# Patient Record
Sex: FEMALE | Race: WHITE | NOT HISPANIC OR LATINO | Employment: FULL TIME | URBAN - METROPOLITAN AREA
[De-identification: names, ages, dates, MRNs, and addresses within clinical notes are randomized per-mention and may not be internally consistent; named-entity substitution may affect disease eponyms.]

---

## 2017-02-07 LAB
EXTERNAL ABO GROUPING: NORMAL
EXTERNAL ANTIBODY SCREEN: NEGATIVE
EXTERNAL CHLAMYDIA SCREEN: NEGATIVE
EXTERNAL GONORRHEA SCREEN: NEGATIVE
EXTERNAL RH FACTOR: POSITIVE
EXTERNAL RUBELLA QUALITATIVE: NORMAL
EXTERNAL SYPHILIS RPR SCREEN: NEGATIVE
EXTERNAL URINE DRUG SCREEN: NEGATIVE
HIV1 AB SPEC QL IA.RAPID: NEGATIVE

## 2017-04-29 PROCEDURE — 87086 URINE CULTURE/COLONY COUNT: CPT | Performed by: FAMILY MEDICINE

## 2017-05-01 ENCOUNTER — TELEPHONE (OUTPATIENT)
Dept: URGENT CARE | Facility: CLINIC | Age: 32
End: 2017-05-01

## 2017-05-07 ENCOUNTER — HOSPITAL ENCOUNTER (OUTPATIENT)
Facility: HOSPITAL | Age: 32
Setting detail: OBSERVATION
Discharge: HOME | End: 2017-05-08
Attending: OBSTETRICS & GYNECOLOGY | Admitting: OBSTETRICS & GYNECOLOGY

## 2017-05-07 PROBLEM — O26.892 PELVIC PAIN AFFECTING PREGNANCY IN SECOND TRIMESTER, ANTEPARTUM: Status: ACTIVE | Noted: 2017-05-07

## 2017-05-07 PROBLEM — B96.89 BACTERIAL VAGINOSIS: Status: ACTIVE | Noted: 2017-05-07

## 2017-05-07 PROBLEM — R10.2 PELVIC PAIN AFFECTING PREGNANCY IN SECOND TRIMESTER, ANTEPARTUM: Status: ACTIVE | Noted: 2017-05-07

## 2017-05-07 PROBLEM — N76.0 BACTERIAL VAGINOSIS: Status: ACTIVE | Noted: 2017-05-07

## 2017-05-07 PROBLEM — O20.9 VAGINAL BLEEDING BEFORE 22 WEEKS GESTATION: Status: ACTIVE | Noted: 2017-05-07

## 2017-05-07 LAB
ABO GROUP BLD: NORMAL
BILIRUB UR QL STRIP: NEGATIVE
BLD GP AB SCN SERPL QL: NEGATIVE
CLARITY UR: CLEAR
COLOR UR: YELLOW
DEPRECATED RDW RBC AUTO: 47.2 FL (ref 37–54)
ERYTHROCYTE [DISTWIDTH] IN BLOOD BY AUTOMATED COUNT: 13.6 % (ref 11.3–14.5)
GLUCOSE UR STRIP-MCNC: NEGATIVE MG/DL
HCT VFR BLD AUTO: 31.1 % (ref 34.5–44)
HGB BLD-MCNC: 10.1 G/DL (ref 11.5–15.5)
HGB UR QL STRIP.AUTO: NEGATIVE
KETONES UR QL STRIP: ABNORMAL
LEUKOCYTE ESTERASE UR QL STRIP.AUTO: NEGATIVE
MCH RBC QN AUTO: 30.9 PG (ref 27–31)
MCHC RBC AUTO-ENTMCNC: 32.5 G/DL (ref 32–36)
MCV RBC AUTO: 95.1 FL (ref 80–99)
NITRITE UR QL STRIP: NEGATIVE
PH UR STRIP.AUTO: 6 [PH] (ref 5–8)
PLATELET # BLD AUTO: 246 10*3/MM3 (ref 150–450)
PMV BLD AUTO: 9.6 FL (ref 6–12)
PROT UR QL STRIP: NEGATIVE
RBC # BLD AUTO: 3.27 10*6/MM3 (ref 3.89–5.14)
RH BLD: POSITIVE
SP GR UR STRIP: 1.02 (ref 1–1.03)
UROBILINOGEN UR QL STRIP: ABNORMAL
WBC NRBC COR # BLD: 13.16 10*3/MM3 (ref 3.5–10.8)

## 2017-05-07 PROCEDURE — 86901 BLOOD TYPING SEROLOGIC RH(D): CPT | Performed by: OBSTETRICS & GYNECOLOGY

## 2017-05-07 PROCEDURE — 85027 COMPLETE CBC AUTOMATED: CPT | Performed by: OBSTETRICS & GYNECOLOGY

## 2017-05-07 PROCEDURE — 99218 PR INITIAL OBSERVATION CARE/DAY 30 MINUTES: CPT | Performed by: OBSTETRICS & GYNECOLOGY

## 2017-05-07 PROCEDURE — 81003 URINALYSIS AUTO W/O SCOPE: CPT | Performed by: OBSTETRICS & GYNECOLOGY

## 2017-05-07 PROCEDURE — G0378 HOSPITAL OBSERVATION PER HR: HCPCS

## 2017-05-07 PROCEDURE — 63710000001 PROMETHAZINE PER 25 MG: Performed by: OBSTETRICS & GYNECOLOGY

## 2017-05-07 PROCEDURE — 96374 THER/PROPH/DIAG INJ IV PUSH: CPT

## 2017-05-07 PROCEDURE — 96361 HYDRATE IV INFUSION ADD-ON: CPT

## 2017-05-07 PROCEDURE — 86900 BLOOD TYPING SEROLOGIC ABO: CPT | Performed by: OBSTETRICS & GYNECOLOGY

## 2017-05-07 PROCEDURE — 25010000002 MORPHINE PER 10 MG: Performed by: OBSTETRICS & GYNECOLOGY

## 2017-05-07 PROCEDURE — 86850 RBC ANTIBODY SCREEN: CPT | Performed by: OBSTETRICS & GYNECOLOGY

## 2017-05-07 RX ORDER — ACETAMINOPHEN 500 MG
1000 TABLET ORAL EVERY 4 HOURS PRN
Status: DISCONTINUED | OUTPATIENT
Start: 2017-05-07 | End: 2017-05-08 | Stop reason: HOSPADM

## 2017-05-07 RX ORDER — PROMETHAZINE HYDROCHLORIDE 25 MG/1
25 TABLET ORAL ONCE
Status: COMPLETED | OUTPATIENT
Start: 2017-05-07 | End: 2017-05-07

## 2017-05-07 RX ORDER — SODIUM CHLORIDE, SODIUM LACTATE, POTASSIUM CHLORIDE, CALCIUM CHLORIDE 600; 310; 30; 20 MG/100ML; MG/100ML; MG/100ML; MG/100ML
999 INJECTION, SOLUTION INTRAVENOUS ONCE
Status: COMPLETED | OUTPATIENT
Start: 2017-05-07 | End: 2017-05-07

## 2017-05-07 RX ORDER — MORPHINE SULFATE 4 MG/ML
4 INJECTION, SOLUTION INTRAMUSCULAR; INTRAVENOUS ONCE
Status: COMPLETED | OUTPATIENT
Start: 2017-05-07 | End: 2017-05-07

## 2017-05-07 RX ORDER — SODIUM CHLORIDE, SODIUM LACTATE, POTASSIUM CHLORIDE, CALCIUM CHLORIDE 600; 310; 30; 20 MG/100ML; MG/100ML; MG/100ML; MG/100ML
125 INJECTION, SOLUTION INTRAVENOUS CONTINUOUS
Status: DISCONTINUED | OUTPATIENT
Start: 2017-05-07 | End: 2017-05-08 | Stop reason: HOSPADM

## 2017-05-07 RX ADMIN — MORPHINE SULFATE 4 MG: 4 INJECTION, SOLUTION INTRAMUSCULAR; INTRAVENOUS at 22:12

## 2017-05-07 RX ADMIN — PROMETHAZINE HYDROCHLORIDE 25 MG: 25 TABLET ORAL at 22:11

## 2017-05-07 RX ADMIN — SODIUM CHLORIDE, POTASSIUM CHLORIDE, SODIUM LACTATE AND CALCIUM CHLORIDE 999 ML/HR: 600; 310; 30; 20 INJECTION, SOLUTION INTRAVENOUS at 22:08

## 2017-05-07 RX ADMIN — ACETAMINOPHEN 1000 MG: 500 TABLET ORAL at 20:04

## 2017-05-07 RX ADMIN — SODIUM CHLORIDE, POTASSIUM CHLORIDE, SODIUM LACTATE AND CALCIUM CHLORIDE 125 ML/HR: 600; 310; 30; 20 INJECTION, SOLUTION INTRAVENOUS at 22:59

## 2017-05-08 ENCOUNTER — APPOINTMENT (OUTPATIENT)
Dept: ULTRASOUND IMAGING | Facility: HOSPITAL | Age: 32
End: 2017-05-08

## 2017-05-08 ENCOUNTER — APPOINTMENT (OUTPATIENT)
Dept: WOMENS IMAGING | Facility: HOSPITAL | Age: 32
End: 2017-05-08

## 2017-05-08 VITALS
RESPIRATION RATE: 16 BRPM | SYSTOLIC BLOOD PRESSURE: 103 MMHG | WEIGHT: 146 LBS | TEMPERATURE: 98 F | DIASTOLIC BLOOD PRESSURE: 55 MMHG | BODY MASS INDEX: 22.91 KG/M2 | HEIGHT: 67 IN | HEART RATE: 85 BPM

## 2017-05-08 PROCEDURE — 76811 OB US DETAILED SNGL FETUS: CPT

## 2017-05-08 PROCEDURE — 76811 OB US DETAILED SNGL FETUS: CPT | Performed by: OBSTETRICS & GYNECOLOGY

## 2017-05-08 PROCEDURE — 96361 HYDRATE IV INFUSION ADD-ON: CPT

## 2017-05-08 PROCEDURE — 76775 US EXAM ABDO BACK WALL LIM: CPT

## 2017-05-08 PROCEDURE — 59025 FETAL NON-STRESS TEST: CPT

## 2017-05-08 PROCEDURE — 25010000002 MORPHINE PER 10 MG: Performed by: OBSTETRICS & GYNECOLOGY

## 2017-05-08 PROCEDURE — 96376 TX/PRO/DX INJ SAME DRUG ADON: CPT

## 2017-05-08 PROCEDURE — G0378 HOSPITAL OBSERVATION PER HR: HCPCS

## 2017-05-08 RX ORDER — MORPHINE SULFATE 4 MG/ML
4 INJECTION, SOLUTION INTRAMUSCULAR; INTRAVENOUS
Status: DISCONTINUED | OUTPATIENT
Start: 2017-05-08 | End: 2017-05-08 | Stop reason: HOSPADM

## 2017-05-08 RX ORDER — OXYCODONE HYDROCHLORIDE AND ACETAMINOPHEN 5; 325 MG/1; MG/1
1 TABLET ORAL EVERY 4 HOURS PRN
Qty: 18 TABLET | Refills: 0 | Status: SHIPPED | OUTPATIENT
Start: 2017-05-08 | End: 2017-07-12 | Stop reason: HOSPADM

## 2017-05-08 RX ADMIN — ACETAMINOPHEN 1000 MG: 500 TABLET ORAL at 12:29

## 2017-05-08 RX ADMIN — SODIUM CHLORIDE, POTASSIUM CHLORIDE, SODIUM LACTATE AND CALCIUM CHLORIDE 125 ML/HR: 600; 310; 30; 20 INJECTION, SOLUTION INTRAVENOUS at 09:18

## 2017-05-08 RX ADMIN — MORPHINE SULFATE 4 MG: 4 INJECTION, SOLUTION INTRAMUSCULAR; INTRAVENOUS at 00:54

## 2017-07-10 ENCOUNTER — HOSPITAL ENCOUNTER (INPATIENT)
Facility: HOSPITAL | Age: 32
LOS: 1 days | Discharge: HOME | End: 2017-07-12
Attending: OBSTETRICS & GYNECOLOGY | Admitting: OBSTETRICS & GYNECOLOGY

## 2017-07-10 LAB
ALBUMIN SERPL-MCNC: 4 G/DL (ref 3.2–4.8)
ALBUMIN/GLOB SERPL: 1.5 G/DL (ref 1.5–2.5)
ALP SERPL-CCNC: 71 U/L (ref 25–100)
ALT SERPL W P-5'-P-CCNC: 14 U/L (ref 7–40)
AMYLASE SERPL-CCNC: 89 U/L (ref 30–118)
ANION GAP SERPL CALCULATED.3IONS-SCNC: 6 MMOL/L (ref 3–11)
AST SERPL-CCNC: 19 U/L (ref 0–33)
BACTERIA UR QL AUTO: ABNORMAL /HPF
BILIRUB SERPL-MCNC: 0.3 MG/DL (ref 0.3–1.2)
BILIRUB UR QL STRIP: NEGATIVE
BUN BLD-MCNC: 8 MG/DL (ref 9–23)
BUN/CREAT SERPL: 16 (ref 7–25)
CALCIUM SPEC-SCNC: 9 MG/DL (ref 8.7–10.4)
CHLORIDE SERPL-SCNC: 107 MMOL/L (ref 99–109)
CLARITY UR: CLEAR
CO2 SERPL-SCNC: 26 MMOL/L (ref 20–31)
COLOR UR: YELLOW
CREAT BLD-MCNC: 0.5 MG/DL (ref 0.6–1.3)
DEPRECATED RDW RBC AUTO: 43 FL (ref 37–54)
ERYTHROCYTE [DISTWIDTH] IN BLOOD BY AUTOMATED COUNT: 13 % (ref 11.3–14.5)
GFR SERPL CREATININE-BSD FRML MDRD: 144 ML/MIN/1.73
GLOBULIN UR ELPH-MCNC: 2.7 GM/DL
GLUCOSE BLD-MCNC: 90 MG/DL (ref 70–100)
GLUCOSE UR STRIP-MCNC: NEGATIVE MG/DL
HCT VFR BLD AUTO: 34.5 % (ref 34.5–44)
HGB BLD-MCNC: 11.5 G/DL (ref 11.5–15.5)
HGB UR QL STRIP.AUTO: NEGATIVE
HYALINE CASTS UR QL AUTO: ABNORMAL /LPF
KETONES UR QL STRIP: ABNORMAL
LEUKOCYTE ESTERASE UR QL STRIP.AUTO: ABNORMAL
LIPASE SERPL-CCNC: 42 U/L (ref 6–51)
MCH RBC QN AUTO: 30.3 PG (ref 27–31)
MCHC RBC AUTO-ENTMCNC: 33.3 G/DL (ref 32–36)
MCV RBC AUTO: 90.8 FL (ref 80–99)
NITRITE UR QL STRIP: NEGATIVE
PH UR STRIP.AUTO: 6.5 [PH] (ref 5–8)
PLATELET # BLD AUTO: 220 10*3/MM3 (ref 150–450)
PMV BLD AUTO: 9.9 FL (ref 6–12)
POTASSIUM BLD-SCNC: 3.8 MMOL/L (ref 3.5–5.5)
PROT SERPL-MCNC: 6.7 G/DL (ref 5.7–8.2)
PROT UR QL STRIP: NEGATIVE
RBC # BLD AUTO: 3.8 10*6/MM3 (ref 3.89–5.14)
RBC # UR: ABNORMAL /HPF
REF LAB TEST METHOD: ABNORMAL
SODIUM BLD-SCNC: 139 MMOL/L (ref 132–146)
SP GR UR STRIP: 1.02 (ref 1–1.03)
SQUAMOUS #/AREA URNS HPF: ABNORMAL /HPF
UROBILINOGEN UR QL STRIP: ABNORMAL
WBC NRBC COR # BLD: 21.35 10*3/MM3 (ref 3.5–10.8)
WBC UR QL AUTO: ABNORMAL /HPF

## 2017-07-10 PROCEDURE — 25010000002 PROMETHAZINE PER 50 MG: Performed by: OBSTETRICS & GYNECOLOGY

## 2017-07-10 PROCEDURE — 83690 ASSAY OF LIPASE: CPT | Performed by: OBSTETRICS & GYNECOLOGY

## 2017-07-10 PROCEDURE — 81001 URINALYSIS AUTO W/SCOPE: CPT | Performed by: OBSTETRICS & GYNECOLOGY

## 2017-07-10 PROCEDURE — 87086 URINE CULTURE/COLONY COUNT: CPT | Performed by: OBSTETRICS & GYNECOLOGY

## 2017-07-10 PROCEDURE — 25010000002 CEFTRIAXONE: Performed by: OBSTETRICS & GYNECOLOGY

## 2017-07-10 PROCEDURE — 80053 COMPREHEN METABOLIC PANEL: CPT | Performed by: OBSTETRICS & GYNECOLOGY

## 2017-07-10 PROCEDURE — 82150 ASSAY OF AMYLASE: CPT | Performed by: OBSTETRICS & GYNECOLOGY

## 2017-07-10 PROCEDURE — 25010000002 MORPHINE PER 10 MG: Performed by: OBSTETRICS & GYNECOLOGY

## 2017-07-10 PROCEDURE — 85027 COMPLETE CBC AUTOMATED: CPT | Performed by: OBSTETRICS & GYNECOLOGY

## 2017-07-10 PROCEDURE — 99221 1ST HOSP IP/OBS SF/LOW 40: CPT | Performed by: OBSTETRICS & GYNECOLOGY

## 2017-07-10 RX ORDER — MORPHINE SULFATE 4 MG/ML
4 INJECTION, SOLUTION INTRAMUSCULAR; INTRAVENOUS
Status: DISCONTINUED | OUTPATIENT
Start: 2017-07-10 | End: 2017-07-12 | Stop reason: HOSPADM

## 2017-07-10 RX ORDER — MORPHINE SULFATE 4 MG/ML
4 INJECTION, SOLUTION INTRAMUSCULAR; INTRAVENOUS ONCE
Status: COMPLETED | OUTPATIENT
Start: 2017-07-10 | End: 2017-07-10

## 2017-07-10 RX ORDER — PROMETHAZINE HYDROCHLORIDE 25 MG/ML
12.5 INJECTION, SOLUTION INTRAMUSCULAR; INTRAVENOUS EVERY 6 HOURS PRN
Status: DISCONTINUED | OUTPATIENT
Start: 2017-07-10 | End: 2017-07-12 | Stop reason: HOSPADM

## 2017-07-10 RX ORDER — SODIUM CHLORIDE, SODIUM LACTATE, POTASSIUM CHLORIDE, CALCIUM CHLORIDE 600; 310; 30; 20 MG/100ML; MG/100ML; MG/100ML; MG/100ML
125 INJECTION, SOLUTION INTRAVENOUS CONTINUOUS
Status: DISCONTINUED | OUTPATIENT
Start: 2017-07-10 | End: 2017-07-11 | Stop reason: SDUPTHER

## 2017-07-10 RX ORDER — CALCIUM CARBONATE 200(500)MG
1 TABLET,CHEWABLE ORAL AS NEEDED
COMMUNITY
End: 2018-07-23

## 2017-07-10 RX ORDER — CEFTRIAXONE SODIUM 2 G/50ML
2 INJECTION, SOLUTION INTRAVENOUS EVERY 24 HOURS
Status: DISCONTINUED | OUTPATIENT
Start: 2017-07-10 | End: 2017-07-10 | Stop reason: SDUPTHER

## 2017-07-10 RX ADMIN — SODIUM CHLORIDE, POTASSIUM CHLORIDE, SODIUM LACTATE AND CALCIUM CHLORIDE 125 ML/HR: 600; 310; 30; 20 INJECTION, SOLUTION INTRAVENOUS at 21:25

## 2017-07-10 RX ADMIN — SODIUM CHLORIDE, POTASSIUM CHLORIDE, SODIUM LACTATE AND CALCIUM CHLORIDE 1000 ML: 600; 310; 30; 20 INJECTION, SOLUTION INTRAVENOUS at 21:05

## 2017-07-10 RX ADMIN — PROMETHAZINE HYDROCHLORIDE 12.5 MG: 25 INJECTION INTRAMUSCULAR; INTRAVENOUS at 21:07

## 2017-07-10 RX ADMIN — MORPHINE SULFATE 4 MG: 4 INJECTION, SOLUTION INTRAMUSCULAR; INTRAVENOUS at 23:12

## 2017-07-10 RX ADMIN — MORPHINE SULFATE 4 MG: 4 INJECTION, SOLUTION INTRAMUSCULAR; INTRAVENOUS at 21:07

## 2017-07-10 RX ADMIN — CEFTRIAXONE 2 G: 2 INJECTION, POWDER, FOR SOLUTION INTRAMUSCULAR; INTRAVENOUS at 23:12

## 2017-07-11 ENCOUNTER — APPOINTMENT (OUTPATIENT)
Dept: MRI IMAGING | Facility: HOSPITAL | Age: 32
End: 2017-07-11
Attending: OBSTETRICS & GYNECOLOGY

## 2017-07-11 PROBLEM — O26.899 ABDOMINAL PAIN AFFECTING PREGNANCY, ANTEPARTUM: Status: ACTIVE | Noted: 2017-07-11

## 2017-07-11 PROBLEM — R10.9 ABDOMINAL PAIN AFFECTING PREGNANCY, ANTEPARTUM: Status: ACTIVE | Noted: 2017-07-11

## 2017-07-11 LAB
BASOPHILS # BLD AUTO: 0.03 10*3/MM3 (ref 0–0.2)
BASOPHILS NFR BLD AUTO: 0.2 % (ref 0–1)
DEPRECATED RDW RBC AUTO: 45.8 FL (ref 37–54)
EOSINOPHIL # BLD AUTO: 0.13 10*3/MM3 (ref 0–0.3)
EOSINOPHIL NFR BLD AUTO: 0.7 % (ref 0–3)
ERYTHROCYTE [DISTWIDTH] IN BLOOD BY AUTOMATED COUNT: 13.3 % (ref 11.3–14.5)
HCT VFR BLD AUTO: 31.9 % (ref 34.5–44)
HGB BLD-MCNC: 10.4 G/DL (ref 11.5–15.5)
IMM GRANULOCYTES # BLD: 0.1 10*3/MM3 (ref 0–0.03)
IMM GRANULOCYTES NFR BLD: 0.6 % (ref 0–0.6)
LYMPHOCYTES # BLD AUTO: 1.67 10*3/MM3 (ref 0.6–4.8)
LYMPHOCYTES NFR BLD AUTO: 9.2 % (ref 24–44)
MCH RBC QN AUTO: 30.6 PG (ref 27–31)
MCHC RBC AUTO-ENTMCNC: 32.6 G/DL (ref 32–36)
MCV RBC AUTO: 93.8 FL (ref 80–99)
MONOCYTES # BLD AUTO: 0.91 10*3/MM3 (ref 0–1)
MONOCYTES NFR BLD AUTO: 5 % (ref 0–12)
NEUTROPHILS # BLD AUTO: 15.33 10*3/MM3 (ref 1.5–8.3)
NEUTROPHILS NFR BLD AUTO: 84.3 % (ref 41–71)
PLATELET # BLD AUTO: 202 10*3/MM3 (ref 150–450)
PMV BLD AUTO: 10.2 FL (ref 6–12)
RBC # BLD AUTO: 3.4 10*6/MM3 (ref 3.89–5.14)
WBC NRBC COR # BLD: 18.17 10*3/MM3 (ref 3.5–10.8)

## 2017-07-11 PROCEDURE — 72195 MRI PELVIS W/O DYE: CPT

## 2017-07-11 PROCEDURE — 59025 FETAL NON-STRESS TEST: CPT

## 2017-07-11 PROCEDURE — 25010000002 CEFTRIAXONE: Performed by: OBSTETRICS & GYNECOLOGY

## 2017-07-11 PROCEDURE — 85025 COMPLETE CBC W/AUTO DIFF WBC: CPT | Performed by: OBSTETRICS & GYNECOLOGY

## 2017-07-11 RX ORDER — ZOLPIDEM TARTRATE 5 MG/1
5 TABLET ORAL NIGHTLY PRN
Status: DISCONTINUED | OUTPATIENT
Start: 2017-07-11 | End: 2017-07-12 | Stop reason: HOSPADM

## 2017-07-11 RX ORDER — ACETAMINOPHEN 500 MG
1000 TABLET ORAL EVERY 6 HOURS PRN
Status: DISCONTINUED | OUTPATIENT
Start: 2017-07-11 | End: 2017-07-12 | Stop reason: HOSPADM

## 2017-07-11 RX ORDER — SODIUM CHLORIDE 9 MG/ML
125 INJECTION, SOLUTION INTRAVENOUS CONTINUOUS
Status: DISCONTINUED | OUTPATIENT
Start: 2017-07-11 | End: 2017-07-12 | Stop reason: HOSPADM

## 2017-07-11 RX ADMIN — SODIUM CHLORIDE 125 ML/HR: 9 INJECTION, SOLUTION INTRAVENOUS at 20:06

## 2017-07-11 RX ADMIN — ZOLPIDEM TARTRATE 5 MG: 5 TABLET, FILM COATED ORAL at 22:54

## 2017-07-11 RX ADMIN — ACETAMINOPHEN 1000 MG: 500 TABLET ORAL at 21:38

## 2017-07-11 RX ADMIN — CEFTRIAXONE 2 G: 2 INJECTION, POWDER, FOR SOLUTION INTRAMUSCULAR; INTRAVENOUS at 20:06

## 2017-07-11 NOTE — H&P
[facility]  Obstetric History and Physical    Chief Complaint   Patient presents with   • Abdominal Pain     started around 1630   • Flank Pain     right started around 1700   • Vomiting     started around 1900       Subjective     Patient is a 31 y.o. female  currently at 28w6d, who presents with right sided flank pain that radiates around to the upper abdomen and now down to her lower abdomen.  She reports that she has had mid-back pain for the last couple of days.  She had a renal stone at 19 weeks and she reports that this feels similar except for the abdominal pain part.  The pain is always there, but worsens in waves.  At it's peak, she says that it is a 7/10.  This new pain started earlier this afternoon which also caused a couple episodes of nausea and vomiting.  She reports normal BM.   Denies fever or chills.  No specific urinary symptoms.  No pre-eclampsia symptoms.   No labour complaints.               Prenatal Information:  Prenatal Results         1st Trimester Ref. Range Date Time   CBC with auto diff       Rubella IgG       Hepatitis B SAg       RPR       ABO  A   17   Rh  Positive   17   Anibody Screen  Negative   17   HIV       Varicella IgG       Urinalysis with microscopy       Urine Culture       GC/Chlamydia/TV       ThinPrep/Pap       2nd and 3rd Trimester Ref. Range Date Time   Hemoglobin / Hematocrit  34.5 % 34.5 - 44.0 % 07/10/17 2100   Hemoglobin  11.5 g/dL 11.5 - 15.5 g/dL 07/10/17 2100   Group B Strep Culture       Glucose Challenge Test 1 Hr       Glucose Fasting       Glucose 1 Hr       Glucose 2 Hr       Glucose 3 Hr       Pre-eclampsia Panel       Risk Screening Ref. Range Date Time   Fetal Fibronectin       Amnisure       Hepatitis C Antibody       Hemoglobin electrophoresis       Cystic Fibrosis       Hemoglobin A1C       MSAFP - 4       NIPT       AFP       Parvovirus IgG       Parvovirus IgM       POCT - glucose       [facility]       24 Hour  urine - Total protein       24 Hour urine - Creatinine clearance       Urinalysis with microscopy       Urine Culture       Drug Screening Ref. Range Date Time   Amphetamine Screen       Barbiturate Screen       Benzodiazepine Screen       Methadone Screen       Phencyclidine Screen       Opiates Screen       THC Screen       Cocaine Screen       Propoxyphene Screen       Buprenorphine Screen       Methamphetamine Screen       Oxycodone Screen       Tryicyclic Antidepressants Screen              Legend: ^: Historical            View all results for this pregnancy             Past OB History:     Obstetric History       T0      TAB0   SAB0   E0   M0   L0       # Outcome Date GA Lbr Shankar/2nd Weight Sex Delivery Anes PTL Lv   1 Current                   Past Medical History: Past Medical History:   Diagnosis Date   • Heartburn     Takes pepcid everyday for 3 years   • HPV (human papilloma virus) infection    • Migraine    • Scoliosis     As a kid   • Seasonal allergies       Past Surgical History Past Surgical History:   Procedure Laterality Date   • SESAMOIDECTOMY FIRST TOE Left    • WISDOM TOOTH EXTRACTION        Family History: Family History   Problem Relation Age of Onset   • Diabetes Paternal Grandfather    • Stroke Paternal Grandmother    • Hypertension Paternal Grandmother    • Breast cancer Maternal Grandmother    • Hypertension Maternal Grandmother    • Melanoma Maternal Grandfather    • Ovarian cancer Maternal Aunt    • Breast cancer Maternal Aunt       Social History:  reports that she has never smoked. She has never used smokeless tobacco.   reports that she does not drink alcohol.   reports that she does not use illicit drugs.        Review of Systems:    Reviewed in EPIC with nurse n      Objective     Vital Signs Range for the last 24 hours  Temperature: Temp:  [98.7 °F (37.1 °C)] 98.7 °F (37.1 °C)   Temp Source: Temp src: Oral   BP: BP: (117)/(65) 117/65   Pulse: Heart Rate:  [108] 108    Respirations: Resp:  [18] 18   SPO2:     O2 Amount (l/min):     O2 Devices     Weight: Weight:  [161 lb (73 kg)] 161 lb (73 kg)     Physical Examination: General appearance - oriented to person, place, and time and ill-appearing  Chest - no tachypnea, retractions or cyanosis  Heart - S1 and S2 normal  Abdomen - tenderness noted in the epigastric area with some mild tenderness throughout.  Gravid uterus without contractions.   Mild right sided CVA tenderness.  No left sided CVA tnderness  bowel sounds normal  Extremities - no pedal edema noted                          Fetal Heart Rate Assessment   Method: Fetal HR Assessment Method: external   Beats/min: Fetal HR (Beats/Min): 135 (pt sitting up in bed)   Baseline: Fetal HR Baseline: normal range (110-160 bpm)   Varibility: Fetal HR Variability: moderate (amplitude range 6 to 25 bpm)   Accels: Fetal HR Accelerations: absent   Decels: Fetal HR Decelerations: absent   Tracing Category:       Uterine Assessment   Method: Method: TOCO (external toco transducer)   Frequency (min):     Ctx Count in 10 min:     Duration:     Intensity: Contraction Intensity: no contractions   Intensity by IUPC:     Resting Tone: Uterine Resting Tone: soft by palpation   Resting Tone by IUPC:     Ramsay Units:       Laboratory Results:   Lab Results   Component Value Date     07/10/2017    HGB 11.5 07/10/2017    HCT 34.5 07/10/2017    WBC 21.35 (H) 07/10/2017     Lab Results   Component Value Date     07/10/2017    K 3.8 07/10/2017     07/10/2017    CO2 26.0 07/10/2017    BUN 8 (L) 07/10/2017    CREATININE 0.50 (L) 07/10/2017    GLUCOSE 90 07/10/2017    ALBUMIN 4.00 07/10/2017    CALCIUM 9.0 07/10/2017    AST 19 07/10/2017    ALT 14 07/10/2017    BILITOT 0.3 07/10/2017     Lab Results   Component Value Date    AMYLASE 89 07/10/2017    LIPASE 42 07/10/2017     Lab Results   Component Value Date    SQUAMEPIUA 0-2 07/10/2017    SPECGRAVUR 1.018 07/10/2017    KETONESU 15  mg/dL (1+) (A) 07/10/2017    BLOODU Negative 07/10/2017    LEUKOCYTESUR Large (3+) (A) 07/10/2017    NITRITEU Negative 07/10/2017    RBCUA 0-2 07/10/2017    WBCUA 31-50 (A) 07/10/2017    BACTERIA Trace 07/10/2017         Assessment/Plan     Active Problems:    Pyelonephritis      Assessment & Plan    Assessment:  1.  Intrauterine pregnancy at 28w6d weeks gestation with reassuring fetal status.    2.  Pyelonephritis  Plan:  1.  Admit  2.  CBC, CMP, UA  3. IV fluids, IV rocephin 2 gms  4. Repeat CBC in a.m.  5. Pain management  6. Intermittent monitoirng      Frandy Nelson MD  7/10/2017  10:23 PM

## 2017-07-11 NOTE — PROGRESS NOTES
Admitted last PM for right sided pain, possible pyelonephritis. She has a history of kidney stones. Her wbc last pm 21, this am 18. Urine +wbcs, no blood, urine culture no growth so far. Reports nausea, vomiting last pm, today reports no appetite. On exam she has no flank tenderness, but specific pain in RLQ, no peritoneal signs. She reports her pain has been worsening since last night. +FM, no contractions. EFM appropriate for gestational age.   Given her current clinical picture, need to rule out appendicitis. Will get MRI. Npo until results back.

## 2017-07-11 NOTE — PLAN OF CARE
Problem: Patient Care Overview (Adult)  Goal: Plan of Care Review  Outcome: Ongoing (interventions implemented as appropriate)    07/11/17 1723   Coping/Psychosocial Response Interventions   Plan Of Care Reviewed With patient   Patient Care Overview   Progress no change       Goal: Adult Individualization and Mutuality  Outcome: Ongoing (interventions implemented as appropriate)    07/11/17 1723   Individualization   Patient Specific Preferences pain reduction   Patient Specific Goals rest   Patient Specific Interventions cluster care, frequent pain assessment       Goal: Discharge Needs Assessment  Outcome: Ongoing (interventions implemented as appropriate)    07/11/17 1723   Discharge Needs Assessment   Concerns To Be Addressed no discharge needs identified

## 2017-07-12 VITALS
HEART RATE: 88 BPM | TEMPERATURE: 98.2 F | HEIGHT: 67 IN | BODY MASS INDEX: 25.27 KG/M2 | WEIGHT: 161 LBS | RESPIRATION RATE: 16 BRPM | DIASTOLIC BLOOD PRESSURE: 55 MMHG | SYSTOLIC BLOOD PRESSURE: 103 MMHG

## 2017-07-12 LAB
BACTERIA SPEC AEROBE CULT: NORMAL
BASOPHILS # BLD MANUAL: 0 10*3/MM3 (ref 0–0.2)
BASOPHILS NFR BLD AUTO: 0 % (ref 0–1)
DEPRECATED RDW RBC AUTO: 47.2 FL (ref 37–54)
EOSINOPHIL # BLD MANUAL: 0.22 10*3/MM3 (ref 0.1–0.3)
EOSINOPHIL NFR BLD MANUAL: 2 % (ref 0–3)
ERYTHROCYTE [DISTWIDTH] IN BLOOD BY AUTOMATED COUNT: 13.7 % (ref 11.3–14.5)
HCT VFR BLD AUTO: 32 % (ref 34.5–44)
HGB BLD-MCNC: 10.3 G/DL (ref 11.5–15.5)
LYMPHOCYTES # BLD MANUAL: 1.62 10*3/MM3 (ref 0.6–4.8)
LYMPHOCYTES NFR BLD MANUAL: 1 % (ref 0–12)
LYMPHOCYTES NFR BLD MANUAL: 15 % (ref 24–44)
MCH RBC QN AUTO: 30.4 PG (ref 27–31)
MCHC RBC AUTO-ENTMCNC: 32.2 G/DL (ref 32–36)
MCV RBC AUTO: 94.4 FL (ref 80–99)
MONOCYTES # BLD AUTO: 0.11 10*3/MM3 (ref 0–1)
NEUTROPHILS # BLD AUTO: 8.86 10*3/MM3 (ref 1.5–8.3)
NEUTROPHILS NFR BLD MANUAL: 69 % (ref 41–71)
NEUTS BAND NFR BLD MANUAL: 13 % (ref 0–5)
PLAT MORPH BLD: NORMAL
PLATELET # BLD AUTO: 204 10*3/MM3 (ref 150–450)
PMV BLD AUTO: 10 FL (ref 6–12)
RBC # BLD AUTO: 3.39 10*6/MM3 (ref 3.89–5.14)
RBC MORPH BLD: NORMAL
WBC MORPH BLD: NORMAL
WBC NRBC COR # BLD: 10.8 10*3/MM3 (ref 3.5–10.8)

## 2017-07-12 PROCEDURE — 85007 BL SMEAR W/DIFF WBC COUNT: CPT | Performed by: OBSTETRICS & GYNECOLOGY

## 2017-07-12 PROCEDURE — 85027 COMPLETE CBC AUTOMATED: CPT | Performed by: OBSTETRICS & GYNECOLOGY

## 2017-07-12 PROCEDURE — 59025 FETAL NON-STRESS TEST: CPT

## 2017-07-12 RX ORDER — AMOXICILLIN AND CLAVULANATE POTASSIUM 875; 125 MG/1; MG/1
1 TABLET, FILM COATED ORAL EVERY 12 HOURS
Qty: 14 TABLET | Refills: 0 | Status: SHIPPED | OUTPATIENT
Start: 2017-07-12 | End: 2017-07-17

## 2017-07-12 NOTE — PROGRESS NOTES
"Lazara Sanchez  1985  [id number]    Surgery Progress Note    Date of visit: 7/12/2017    Subjective:no new complaints  Minimal abdominal pain  Taking PO well    Objective:    BP 99/56  Pulse 105  Temp 98.6 °F (37 °C) (Oral)   Resp 16  Ht 67\" (170.2 cm)  Wt 161 lb (73 kg)  BMI 25.22 kg/m2  No intake or output data in the 24 hours ending 07/12/17 1010    CV: Regular rate and rhythm  L: normal air entry    Abd: gravid, soft with mild lateral right sided tenderness. No guarding      LABS:      Results from last 7 days  Lab Units 07/12/17  0739   WBC 10*3/mm3 10.80   HEMOGLOBIN g/dL 10.3*   HEMATOCRIT % 32.0*   PLATELETS 10*3/mm3 204       Results from last 7 days  Lab Units 07/10/17  2100   SODIUM mmol/L 139   POTASSIUM mmol/L 3.8   CHLORIDE mmol/L 107   CO2 mmol/L 26.0   BUN mg/dL 8*   CREATININE mg/dL 0.50*   CALCIUM mg/dL 9.0   BILIRUBIN mg/dL 0.3   ALK PHOS U/L 71   ALT (SGPT) U/L 14   AST (SGOT) U/L 19   GLUCOSE mg/dL 90       Results from last 7 days  Lab Units 07/10/17  2100   SODIUM mmol/L 139   POTASSIUM mmol/L 3.8   CHLORIDE mmol/L 107   CO2 mmol/L 26.0   BUN mg/dL 8*   CREATININE mg/dL 0.50*   GLUCOSE mg/dL 90   CALCIUM mg/dL 9.0       0  Lab Value Date/Time   LIPASE 42 07/10/2017 2100         Assessment/ Plan: stable course  UTI responding to antibiotics  No clinical evidence of appendicitis.  WBC normalized  Advance diet  Will follow as needed    Problem List Items Addressed This Visit     None            Maurice Toribio MD  7/12/2017  10:10 AM    "

## 2017-07-12 NOTE — PROGRESS NOTES
Have discussed patient and MRI result with Dr SLOAN, Dr Chago Mcdonald. Dr Mcdonald had read the MRI initially as low concern for appendicitis, (in contrast to the teleradiology read) and so he had MRI reviewed by several other radiologists and there is agreement that appendicitis is unlikely based on MRI images. They are going to send an addendum/ correction to her MRI read from last night. This agrees with her current clinical picture. D/w Dr. SLOAN, who is ok with her DC home today. Reasonable to leave her on 5 days of PO antibiotics. Discussed this with fransisca over the phone since i am in an outside office today. Discussed returning to hospital if symptoms return.

## 2017-07-12 NOTE — CONSULTS
Lazara Sanchez  [id number]  1985       Patient Care Team:  Lisa Belle MD as PCP - General (Obstetrics and Gynecology)        General Surgery Consult  Date 7/11/17  Consultant Dr Belle      Chief complaint abdominal pain    Subjective     Patient is a 31 y.o. female presents with abdominal pain. Onset of symptoms was gradual starting 1 day ago.  Symptoms are associated with nausea and vomiting.  She has had loose stools for about 2 weeks now.  She is 29 weeks pregnant and has had hospitalization in May secondary to kidney stone that she passed.  She complains having mostly upper abdominal pain that radiated to the right side and to her back.  White blood count at admission was 21,000 down to 18,000 today.  MRI of her abdomen was obtained today out of concern for possible appendicitis and this was noted to be negative for any inflammation or edema around the cecum.  The patient does have leukocyte esterase and white blood cells in her urine and has been on Rocephin.  She denies having any fever.  She is hungry and has not had any further episodes of nausea or vomiting today.    Allergies   Allergen Reactions   • Adhesive Tape Rash       Home Medications:   No current facility-administered medications on file prior to encounter.      Current Outpatient Prescriptions on File Prior to Encounter   Medication Sig   • famotidine (PEPCID) 20 MG tablet Take 20 mg by mouth 2 (Two) Times a Day.   • fexofenadine (ALLEGRA) 30 MG tablet Take 30 mg by mouth 2 (Two) Times a Day.   • Prenatal Vit-Fe Fumarate-FA (PRENATAL, CLASSIC, VITAMIN) 28-0.8 MG tablet tablet Take  by mouth Daily.   • cephalexin (KEFLEX) 500 MG capsule Take 500 mg by mouth 2 (Two) Times a Day.   • oxyCODONE-acetaminophen (PERCOCET) 5-325 MG per tablet Take 1 tablet by mouth Every 4 (Four) Hours As Needed for Moderate Pain (4-6).       PMHx:   Patient Active Problem List   Diagnosis   • Pelvic pain affecting pregnancy in second trimester, antepartum   •  "Vaginal bleeding before 22 weeks gestation   • Bacterial vaginosis   • Abdominal pain affecting pregnancy, antepartum       Past Surgical History: Delong teeth extraction  Excision of the sesamoid bone    Family History: Noncontributory    Social History: No tobacco use  Occasional alcohol use  She works at "[employer]    Review of Systems   Pertinent items are noted in HPI, all other systems reviewed and negative      Physical Exam:    Objective     Vital Signs  Blood pressure 97/55, pulse 87, temperature 99.2 °F (37.3 °C), temperature source Oral, resp. rate 16, height 67\" (170.2 cm), weight 161 lb (73 kg).  No intake or output data in the 24 hours ending 07/11/17 2016    Physical Exam:      General Appearance:    Alert, cooperative, in no acute distress   Head:    Normocephalic, without obvious abnormality, atraumatic   Eyes:            Lids and lashes normal, conjunctivae and sclerae normal, no   icterus, no pallor, corneas clear, PERRLA   Ears:    Ears appear intact with no abnormalities noted   Throat:   No oral lesions, no thrush, oral mucosa moist   Neck:   No adenopathy, supple, trachea midline, no thyromegaly, no     carotid bruit, no JVD   Back:     No kyphosis present, no scoliosis present, no skin lesions,       erythema or scars, no tenderness to percussion or                   palpation,   range of motion normal   Lungs:     Clear to auscultation,respirations regular, even and                   unlabored    Heart:    Regular rhythm and normal rate, normal S1 and S2, no            murmur, no gallop, no rub, no click   Breast Exam:    Deferred   Abdomen:     Normal bowel sounds, Gravid with the uterus above the umbilicus.  Pinpoint tenderness is appreciated the level of the umbilicus laterally on the right side .  No guarding or peritoneal signs noted    Genitalia:    Deferred   Extremities:   Moves all extremities well, no edema, no cyanosis, no              redness   Pulses:   Pulses palpable and equal " bilaterally   Skin:   No bleeding, bruising or rash   Lymph nodes:   No palpable adenopathy   Neurologic:   Cranial nerves 2 - 12 grossly intact, sensation intact, DTR        present and equal bilaterally       Results Review:    I reviewed the patient's new clinical results.    Results from last 7 days  Lab Units 07/11/17  0931   WBC 10*3/mm3 18.17*   HEMOGLOBIN g/dL 10.4*   HEMATOCRIT % 31.9*   PLATELETS 10*3/mm3 202       Results from last 7 days  Lab Units 07/10/17  2100   SODIUM mmol/L 139   POTASSIUM mmol/L 3.8   CHLORIDE mmol/L 107   CO2 mmol/L 26.0   BUN mg/dL 8*   CREATININE mg/dL 0.50*   GLUCOSE mg/dL 90   CALCIUM mg/dL 9.0       ASSESSMENT AND PLAN: Abdominal pain that is right sided with UTI on admission and leukocytosis.  The pain has localized to the right side with concern about  possible appendicitis  Abdominal MRI is negative for edema or inflammation around the cecum to suggest appendicitis  Patient is clinically stable at this time with no worsening pain  I recommend repeating the CBC in the morning and continue with Rocephin in the meantime  Hold off any surgical intervention unless deemed necessary  The patient is in agreement with the plan  I discussed the findings also with Dr. Belle    Active Problems:    Abdominal pain affecting pregnancy, antepartum        I discussed the patients findings and my recommendations with patient, family and consulting provider.   Thank you for the consultation.    Maurice Toribio MD  07/11/17  8:16 PM

## 2017-07-12 NOTE — PROGRESS NOTES
No issues overnight. Remains afebrile. CBC pending this am. Urine culture only positive for normal nenita. Lazara feels well, required no pain meds overnight. Reports the right sided abdominal pain is slightly improved. She ate breakfast this am, no nausea vomiting.   No CVA tenderness  Abdominal tenderness in same spot on right mid abdomen, no peritoneal signs.   EFM reassuring for gestational age.   MRI last PM read as slight thickening in mid appendix, will assoc edema, concern for early appendicitis.   No evidence pyelo at this time, still monitoring for appendicitis, but patient does seem better this am. Will defer to Dr NATHALIA kaufman on this. FU on am WBC. She was supposed to be NPO this am.

## 2017-09-01 ENCOUNTER — LAB REQUISITION (OUTPATIENT)
Dept: LAB | Facility: HOSPITAL | Age: 32
End: 2017-09-01

## 2017-09-01 DIAGNOSIS — Z34.83 ENCOUNTER FOR SUPERVISION OF OTHER NORMAL PREGNANCY, THIRD TRIMESTER: ICD-10-CM

## 2017-09-01 LAB — EXTERNAL GROUP B STREP ANTIGEN: NEGATIVE

## 2017-09-01 PROCEDURE — 87081 CULTURE SCREEN ONLY: CPT | Performed by: OBSTETRICS & GYNECOLOGY

## 2017-09-04 LAB — BACTERIA SPEC AEROBE CULT: NORMAL

## 2017-09-21 ENCOUNTER — PREP FOR SURGERY (OUTPATIENT)
Dept: OTHER | Facility: HOSPITAL | Age: 32
End: 2017-09-21

## 2017-09-21 DIAGNOSIS — Z34.93 PREGNANT AND NOT YET DELIVERED IN THIRD TRIMESTER: Primary | ICD-10-CM

## 2017-09-21 RX ORDER — OXYCODONE HYDROCHLORIDE AND ACETAMINOPHEN 5; 325 MG/1; MG/1
1 TABLET ORAL EVERY 4 HOURS PRN
Status: CANCELLED | OUTPATIENT
Start: 2017-09-21 | End: 2017-10-01

## 2017-09-21 RX ORDER — PROMETHAZINE HYDROCHLORIDE 12.5 MG/1
12.5 SUPPOSITORY RECTAL EVERY 6 HOURS PRN
Status: CANCELLED | OUTPATIENT
Start: 2017-09-21

## 2017-09-21 RX ORDER — PROMETHAZINE HYDROCHLORIDE 25 MG/ML
12.5 INJECTION, SOLUTION INTRAMUSCULAR; INTRAVENOUS EVERY 6 HOURS PRN
Status: CANCELLED | OUTPATIENT
Start: 2017-09-21

## 2017-09-21 RX ORDER — ACETAMINOPHEN 325 MG/1
650 TABLET ORAL EVERY 4 HOURS PRN
Status: CANCELLED | OUTPATIENT
Start: 2017-09-21

## 2017-09-21 RX ORDER — LIDOCAINE HYDROCHLORIDE 10 MG/ML
5 INJECTION, SOLUTION INFILTRATION; PERINEURAL AS NEEDED
Status: CANCELLED | OUTPATIENT
Start: 2017-09-21

## 2017-09-21 RX ORDER — BUTORPHANOL TARTRATE 1 MG/ML
1 INJECTION, SOLUTION INTRAMUSCULAR; INTRAVENOUS
Status: CANCELLED | OUTPATIENT
Start: 2017-09-21

## 2017-09-21 RX ORDER — SODIUM CHLORIDE, SODIUM LACTATE, POTASSIUM CHLORIDE, CALCIUM CHLORIDE 600; 310; 30; 20 MG/100ML; MG/100ML; MG/100ML; MG/100ML
125 INJECTION, SOLUTION INTRAVENOUS CONTINUOUS
Status: CANCELLED | OUTPATIENT
Start: 2017-09-21

## 2017-09-21 RX ORDER — OXYTOCIN/RINGER'S LACTATE 30/500 ML
2-24 PLASTIC BAG, INJECTION (ML) INTRAVENOUS
Status: CANCELLED | OUTPATIENT
Start: 2017-09-22

## 2017-09-21 RX ORDER — MORPHINE SULFATE 2 MG/ML
2 INJECTION, SOLUTION INTRAMUSCULAR; INTRAVENOUS
Status: CANCELLED | OUTPATIENT
Start: 2017-09-21 | End: 2017-10-01

## 2017-09-21 RX ORDER — ZOLPIDEM TARTRATE 5 MG/1
5 TABLET ORAL NIGHTLY PRN
Status: CANCELLED | OUTPATIENT
Start: 2017-09-21 | End: 2017-10-01

## 2017-09-21 RX ORDER — SODIUM CHLORIDE 0.9 % (FLUSH) 0.9 %
1-10 SYRINGE (ML) INJECTION AS NEEDED
Status: CANCELLED | OUTPATIENT
Start: 2017-09-21

## 2017-09-21 RX ORDER — FAMOTIDINE 10 MG/ML
20 INJECTION, SOLUTION INTRAVENOUS EVERY 12 HOURS SCHEDULED
Status: CANCELLED | OUTPATIENT
Start: 2017-09-21

## 2017-09-21 RX ORDER — FAMOTIDINE 20 MG/1
20 TABLET, FILM COATED ORAL EVERY 12 HOURS SCHEDULED
Status: CANCELLED | OUTPATIENT
Start: 2017-09-21

## 2017-09-21 RX ORDER — PROMETHAZINE HYDROCHLORIDE 12.5 MG/1
12.5 TABLET ORAL EVERY 6 HOURS PRN
Status: CANCELLED | OUTPATIENT
Start: 2017-09-21

## 2017-09-21 RX ORDER — OXYTOCIN/RINGER'S LACTATE 20/1000 ML
999 PLASTIC BAG, INJECTION (ML) INTRAVENOUS ONCE
Status: CANCELLED | OUTPATIENT
Start: 2017-09-21 | End: 2017-09-21

## 2017-09-21 RX ORDER — TERBUTALINE SULFATE 1 MG/ML
0.25 INJECTION, SOLUTION SUBCUTANEOUS AS NEEDED
Status: CANCELLED | OUTPATIENT
Start: 2017-09-21

## 2017-09-21 RX ORDER — CARBOPROST TROMETHAMINE 250 UG/ML
250 INJECTION, SOLUTION INTRAMUSCULAR AS NEEDED
Status: CANCELLED | OUTPATIENT
Start: 2017-09-21

## 2017-09-21 RX ORDER — OXYCODONE AND ACETAMINOPHEN 7.5; 325 MG/1; MG/1
2 TABLET ORAL EVERY 4 HOURS PRN
Status: CANCELLED | OUTPATIENT
Start: 2017-09-21 | End: 2017-10-01

## 2017-09-21 RX ORDER — OXYTOCIN/RINGER'S LACTATE 20/1000 ML
125 PLASTIC BAG, INJECTION (ML) INTRAVENOUS CONTINUOUS PRN
Status: CANCELLED | OUTPATIENT
Start: 2017-09-21 | End: 2017-09-22

## 2017-09-21 RX ORDER — MISOPROSTOL 200 UG/1
800 TABLET ORAL AS NEEDED
Status: CANCELLED | OUTPATIENT
Start: 2017-09-21

## 2017-09-21 RX ORDER — METHYLERGONOVINE MALEATE 0.2 MG/ML
200 INJECTION INTRAVENOUS ONCE AS NEEDED
Status: CANCELLED | OUTPATIENT
Start: 2017-09-21

## 2017-09-22 ENCOUNTER — ANESTHESIA (OUTPATIENT)
Dept: LABOR AND DELIVERY | Facility: HOSPITAL | Age: 32
End: 2017-09-22

## 2017-09-22 ENCOUNTER — ANESTHESIA EVENT (OUTPATIENT)
Dept: LABOR AND DELIVERY | Facility: HOSPITAL | Age: 32
End: 2017-09-22

## 2017-09-22 ENCOUNTER — HOSPITAL ENCOUNTER (INPATIENT)
Facility: HOSPITAL | Age: 32
LOS: 2 days | Discharge: HOME | End: 2017-09-24
Attending: OBSTETRICS & GYNECOLOGY | Admitting: OBSTETRICS & GYNECOLOGY

## 2017-09-22 DIAGNOSIS — Z34.93 PREGNANT AND NOT YET DELIVERED IN THIRD TRIMESTER: ICD-10-CM

## 2017-09-22 LAB
ABO GROUP BLD: NORMAL
ATMOSPHERIC PRESS: ABNORMAL MMHG
ATMOSPHERIC PRESS: ABNORMAL MMHG
BASE EXCESS BLDCOA CALC-SCNC: -9.6 MMOL/L (ref 0–2)
BASE EXCESS BLDCOV CALC-SCNC: -4.6 MMOL/L (ref 0–2)
BDY SITE: ABNORMAL
BLD GP AB SCN SERPL QL: NEGATIVE
CO2 BLDA-SCNC: 21.5 MMOL/L (ref 22–33)
CO2 BLDA-SCNC: 22.3 MMOL/L (ref 22–33)
DEPRECATED RDW RBC AUTO: 47.4 FL (ref 37–54)
ERYTHROCYTE [DISTWIDTH] IN BLOOD BY AUTOMATED COUNT: 14.6 % (ref 11.3–14.5)
HCO3 BLDCOA-SCNC: 19.8 MMOL/L (ref 16.9–20.5)
HCO3 BLDCOV-SCNC: 21.1 MMOL/L (ref 18.6–21.4)
HCT VFR BLD AUTO: 35.4 % (ref 34.5–44)
HGB BLD-MCNC: 11.5 G/DL (ref 11.5–15.5)
HGB BLDA-MCNC: 12.8 G/DL (ref 14–18)
HGB BLDA-MCNC: 13.3 G/DL (ref 14–18)
HOROWITZ INDEX BLD+IHG-RTO: 21 %
HOROWITZ INDEX BLD+IHG-RTO: 21 %
MCH RBC QN AUTO: 29.3 PG (ref 27–31)
MCHC RBC AUTO-ENTMCNC: 32.5 G/DL (ref 32–36)
MCV RBC AUTO: 90.3 FL (ref 80–99)
MODALITY: ABNORMAL
MODALITY: ABNORMAL
PCO2 BLDCOA: 57.3 MMHG (ref 43.3–54.9)
PCO2 BLDCOV: 40.4 MM HG (ref 30–60)
PH BLDCOA: 7.15 PH UNITS (ref 7.2–7.3)
PH BLDCOV: 7.33 PH UNITS (ref 7.19–7.46)
PLATELET # BLD AUTO: 248 10*3/MM3 (ref 150–450)
PMV BLD AUTO: 10.9 FL (ref 6–12)
PO2 BLDCOA: 24.4 MMHG (ref 11.5–43.3)
PO2 BLDCOV: 28.4 MM HG
RBC # BLD AUTO: 3.92 10*6/MM3 (ref 3.89–5.14)
RH BLD: POSITIVE
SAO2 % BLDCOA: 37.7 %
SAO2 % BLDCOA: ABNORMAL % (ref 45–75)
SAO2 % BLDCOV: 61.7 %
WBC NRBC COR # BLD: 14.5 10*3/MM3 (ref 3.5–10.8)

## 2017-09-22 PROCEDURE — 85027 COMPLETE CBC AUTOMATED: CPT | Performed by: OBSTETRICS & GYNECOLOGY

## 2017-09-22 PROCEDURE — 25010000002 ROPIVACAINE PER 1 MG: Performed by: ANESTHESIOLOGY

## 2017-09-22 PROCEDURE — 82805 BLOOD GASES W/O2 SATURATION: CPT | Performed by: OBSTETRICS & GYNECOLOGY

## 2017-09-22 PROCEDURE — 25010000002 ONDANSETRON PER 1 MG: Performed by: OBSTETRICS & GYNECOLOGY

## 2017-09-22 PROCEDURE — 59025 FETAL NON-STRESS TEST: CPT

## 2017-09-22 PROCEDURE — C1755 CATHETER, INTRASPINAL: HCPCS

## 2017-09-22 PROCEDURE — C1755 CATHETER, INTRASPINAL: HCPCS | Performed by: ANESTHESIOLOGY

## 2017-09-22 PROCEDURE — 25010000002 FENTANYL CITRATE (PF) 100 MCG/2ML SOLUTION: Performed by: ANESTHESIOLOGY

## 2017-09-22 PROCEDURE — 25010000002 ONDANSETRON PER 1 MG: Performed by: ANESTHESIOLOGY

## 2017-09-22 PROCEDURE — 86900 BLOOD TYPING SEROLOGIC ABO: CPT | Performed by: OBSTETRICS & GYNECOLOGY

## 2017-09-22 PROCEDURE — 86850 RBC ANTIBODY SCREEN: CPT | Performed by: OBSTETRICS & GYNECOLOGY

## 2017-09-22 PROCEDURE — 0HQ9XZZ REPAIR PERINEUM SKIN, EXTERNAL APPROACH: ICD-10-PCS | Performed by: OBSTETRICS & GYNECOLOGY

## 2017-09-22 PROCEDURE — 86901 BLOOD TYPING SEROLOGIC RH(D): CPT | Performed by: OBSTETRICS & GYNECOLOGY

## 2017-09-22 PROCEDURE — 25010000002 BUTORPHANOL PER 1 MG: Performed by: OBSTETRICS & GYNECOLOGY

## 2017-09-22 RX ORDER — LIDOCAINE HYDROCHLORIDE AND EPINEPHRINE 15; 5 MG/ML; UG/ML
INJECTION, SOLUTION EPIDURAL AS NEEDED
Status: DISCONTINUED | OUTPATIENT
Start: 2017-09-22 | End: 2017-09-22 | Stop reason: SURG

## 2017-09-22 RX ORDER — PROMETHAZINE HYDROCHLORIDE 25 MG/ML
12.5 INJECTION, SOLUTION INTRAMUSCULAR; INTRAVENOUS EVERY 6 HOURS PRN
Status: DISCONTINUED | OUTPATIENT
Start: 2017-09-22 | End: 2017-09-22 | Stop reason: HOSPADM

## 2017-09-22 RX ORDER — FENTANYL CITRATE 50 UG/ML
INJECTION, SOLUTION INTRAMUSCULAR; INTRAVENOUS AS NEEDED
Status: DISCONTINUED | OUTPATIENT
Start: 2017-09-22 | End: 2017-09-22 | Stop reason: SURG

## 2017-09-22 RX ORDER — METHYLERGONOVINE MALEATE 0.2 MG/ML
200 INJECTION INTRAVENOUS ONCE AS NEEDED
Status: DISCONTINUED | OUTPATIENT
Start: 2017-09-22 | End: 2017-09-22 | Stop reason: HOSPADM

## 2017-09-22 RX ORDER — TRISODIUM CITRATE DIHYDRATE AND CITRIC ACID MONOHYDRATE 500; 334 MG/5ML; MG/5ML
30 SOLUTION ORAL ONCE
Status: DISCONTINUED | OUTPATIENT
Start: 2017-09-22 | End: 2017-09-22 | Stop reason: HOSPADM

## 2017-09-22 RX ORDER — ROPIVACAINE HYDROCHLORIDE 5 MG/ML
INJECTION, SOLUTION EPIDURAL; INFILTRATION; PERINEURAL AS NEEDED
Status: DISCONTINUED | OUTPATIENT
Start: 2017-09-22 | End: 2017-09-22 | Stop reason: SURG

## 2017-09-22 RX ORDER — MORPHINE SULFATE 4 MG/ML
2 INJECTION, SOLUTION INTRAMUSCULAR; INTRAVENOUS
Status: DISCONTINUED | OUTPATIENT
Start: 2017-09-22 | End: 2017-09-22 | Stop reason: HOSPADM

## 2017-09-22 RX ORDER — PROMETHAZINE HYDROCHLORIDE 25 MG/ML
12.5 INJECTION, SOLUTION INTRAMUSCULAR; INTRAVENOUS EVERY 6 HOURS PRN
Status: DISCONTINUED | OUTPATIENT
Start: 2017-09-22 | End: 2017-09-24 | Stop reason: HOSPADM

## 2017-09-22 RX ORDER — OXYCODONE AND ACETAMINOPHEN 7.5; 325 MG/1; MG/1
2 TABLET ORAL EVERY 4 HOURS PRN
Status: DISCONTINUED | OUTPATIENT
Start: 2017-09-22 | End: 2017-09-22 | Stop reason: HOSPADM

## 2017-09-22 RX ORDER — OXYTOCIN/RINGER'S LACTATE 20/1000 ML
999 PLASTIC BAG, INJECTION (ML) INTRAVENOUS ONCE
Status: COMPLETED | OUTPATIENT
Start: 2017-09-22 | End: 2017-09-22

## 2017-09-22 RX ORDER — ONDANSETRON 2 MG/ML
4 INJECTION INTRAMUSCULAR; INTRAVENOUS EVERY 6 HOURS PRN
Status: COMPLETED | OUTPATIENT
Start: 2017-09-22 | End: 2017-09-22

## 2017-09-22 RX ORDER — PROMETHAZINE HYDROCHLORIDE 25 MG/ML
12.5 INJECTION, SOLUTION INTRAMUSCULAR; INTRAVENOUS EVERY 6 HOURS PRN
Status: DISCONTINUED | OUTPATIENT
Start: 2017-09-22 | End: 2017-09-22 | Stop reason: SDUPTHER

## 2017-09-22 RX ORDER — ZOLPIDEM TARTRATE 5 MG/1
5 TABLET ORAL NIGHTLY PRN
Status: DISCONTINUED | OUTPATIENT
Start: 2017-09-22 | End: 2017-09-22 | Stop reason: HOSPADM

## 2017-09-22 RX ORDER — SODIUM CHLORIDE 0.9 % (FLUSH) 0.9 %
1-10 SYRINGE (ML) INJECTION AS NEEDED
Status: DISCONTINUED | OUTPATIENT
Start: 2017-09-22 | End: 2017-09-22 | Stop reason: HOSPADM

## 2017-09-22 RX ORDER — RANITIDINE 150 MG/1
150 TABLET ORAL 2 TIMES DAILY
COMMUNITY
End: 2018-07-23

## 2017-09-22 RX ORDER — EPHEDRINE SULFATE/0.9% NACL/PF 50 MG/10ML
5 SYRINGE (ML) INTRAVENOUS
Status: DISCONTINUED | OUTPATIENT
Start: 2017-09-22 | End: 2017-09-22 | Stop reason: HOSPADM

## 2017-09-22 RX ORDER — OXYCODONE HYDROCHLORIDE AND ACETAMINOPHEN 5; 325 MG/1; MG/1
1 TABLET ORAL EVERY 4 HOURS PRN
Status: DISCONTINUED | OUTPATIENT
Start: 2017-09-22 | End: 2017-09-24 | Stop reason: HOSPADM

## 2017-09-22 RX ORDER — PROMETHAZINE HYDROCHLORIDE 12.5 MG/1
12.5 SUPPOSITORY RECTAL EVERY 6 HOURS PRN
Status: DISCONTINUED | OUTPATIENT
Start: 2017-09-22 | End: 2017-09-22 | Stop reason: SDUPTHER

## 2017-09-22 RX ORDER — CARBOPROST TROMETHAMINE 250 UG/ML
250 INJECTION, SOLUTION INTRAMUSCULAR AS NEEDED
Status: DISCONTINUED | OUTPATIENT
Start: 2017-09-22 | End: 2017-09-22

## 2017-09-22 RX ORDER — SODIUM CHLORIDE, SODIUM LACTATE, POTASSIUM CHLORIDE, CALCIUM CHLORIDE 600; 310; 30; 20 MG/100ML; MG/100ML; MG/100ML; MG/100ML
125 INJECTION, SOLUTION INTRAVENOUS CONTINUOUS
Status: DISCONTINUED | OUTPATIENT
Start: 2017-09-22 | End: 2017-09-23

## 2017-09-22 RX ORDER — PROMETHAZINE HYDROCHLORIDE 25 MG/ML
12.5 INJECTION, SOLUTION INTRAMUSCULAR; INTRAVENOUS EVERY 6 HOURS PRN
Status: DISCONTINUED | OUTPATIENT
Start: 2017-09-22 | End: 2017-09-22

## 2017-09-22 RX ORDER — ZOLPIDEM TARTRATE 5 MG/1
5 TABLET ORAL NIGHTLY PRN
Status: DISCONTINUED | OUTPATIENT
Start: 2017-09-22 | End: 2017-09-24 | Stop reason: HOSPADM

## 2017-09-22 RX ORDER — SODIUM CHLORIDE 0.9 % (FLUSH) 0.9 %
1-10 SYRINGE (ML) INJECTION AS NEEDED
Status: DISCONTINUED | OUTPATIENT
Start: 2017-09-22 | End: 2017-09-24 | Stop reason: HOSPADM

## 2017-09-22 RX ORDER — ONDANSETRON 2 MG/ML
4 INJECTION INTRAMUSCULAR; INTRAVENOUS EVERY 6 HOURS PRN
Status: DISCONTINUED | OUTPATIENT
Start: 2017-09-22 | End: 2017-09-23

## 2017-09-22 RX ORDER — FAMOTIDINE 20 MG/1
20 TABLET, FILM COATED ORAL EVERY 12 HOURS SCHEDULED
Status: DISCONTINUED | OUTPATIENT
Start: 2017-09-22 | End: 2017-09-22 | Stop reason: HOSPADM

## 2017-09-22 RX ORDER — FERROUS SULFATE 325(65) MG
325 TABLET ORAL
COMMUNITY
End: 2018-07-23

## 2017-09-22 RX ORDER — OXYCODONE HYDROCHLORIDE AND ACETAMINOPHEN 5; 325 MG/1; MG/1
1 TABLET ORAL EVERY 4 HOURS PRN
Status: DISCONTINUED | OUTPATIENT
Start: 2017-09-22 | End: 2017-09-22

## 2017-09-22 RX ORDER — CARBOPROST TROMETHAMINE 250 UG/ML
250 INJECTION, SOLUTION INTRAMUSCULAR AS NEEDED
Status: DISCONTINUED | OUTPATIENT
Start: 2017-09-22 | End: 2017-09-22 | Stop reason: HOSPADM

## 2017-09-22 RX ORDER — PROMETHAZINE HYDROCHLORIDE 12.5 MG/1
12.5 SUPPOSITORY RECTAL EVERY 6 HOURS PRN
Status: DISCONTINUED | OUTPATIENT
Start: 2017-09-22 | End: 2017-09-22

## 2017-09-22 RX ORDER — ACETAMINOPHEN 325 MG/1
650 TABLET ORAL EVERY 4 HOURS PRN
Status: DISCONTINUED | OUTPATIENT
Start: 2017-09-22 | End: 2017-09-22

## 2017-09-22 RX ORDER — METHYLERGONOVINE MALEATE 0.2 MG/ML
200 INJECTION INTRAVENOUS ONCE AS NEEDED
Status: DISCONTINUED | OUTPATIENT
Start: 2017-09-22 | End: 2017-09-22

## 2017-09-22 RX ORDER — METOCLOPRAMIDE HYDROCHLORIDE 5 MG/ML
10 INJECTION INTRAMUSCULAR; INTRAVENOUS ONCE AS NEEDED
Status: DISCONTINUED | OUTPATIENT
Start: 2017-09-22 | End: 2017-09-22 | Stop reason: HOSPADM

## 2017-09-22 RX ORDER — ACETAMINOPHEN 325 MG/1
650 TABLET ORAL EVERY 4 HOURS PRN
Status: DISCONTINUED | OUTPATIENT
Start: 2017-09-22 | End: 2017-09-22 | Stop reason: HOSPADM

## 2017-09-22 RX ORDER — BISACODYL 10 MG
10 SUPPOSITORY, RECTAL RECTAL DAILY PRN
Status: DISCONTINUED | OUTPATIENT
Start: 2017-09-23 | End: 2017-09-24 | Stop reason: HOSPADM

## 2017-09-22 RX ORDER — OXYCODONE HYDROCHLORIDE AND ACETAMINOPHEN 5; 325 MG/1; MG/1
1 TABLET ORAL EVERY 4 HOURS PRN
Status: DISCONTINUED | OUTPATIENT
Start: 2017-09-22 | End: 2017-09-22 | Stop reason: HOSPADM

## 2017-09-22 RX ORDER — FAMOTIDINE 10 MG/ML
20 INJECTION, SOLUTION INTRAVENOUS ONCE AS NEEDED
Status: DISCONTINUED | OUTPATIENT
Start: 2017-09-22 | End: 2017-09-22 | Stop reason: HOSPADM

## 2017-09-22 RX ORDER — HYDROCODONE BITARTRATE AND ACETAMINOPHEN 5; 325 MG/1; MG/1
1 TABLET ORAL EVERY 4 HOURS PRN
Status: DISCONTINUED | OUTPATIENT
Start: 2017-09-22 | End: 2017-09-24 | Stop reason: HOSPADM

## 2017-09-22 RX ORDER — OXYTOCIN/RINGER'S LACTATE 30/500 ML
2-24 PLASTIC BAG, INJECTION (ML) INTRAVENOUS
Status: DISCONTINUED | OUTPATIENT
Start: 2017-09-23 | End: 2017-09-22

## 2017-09-22 RX ORDER — FAMOTIDINE 10 MG/ML
20 INJECTION, SOLUTION INTRAVENOUS EVERY 12 HOURS SCHEDULED
Status: DISCONTINUED | OUTPATIENT
Start: 2017-09-22 | End: 2017-09-22 | Stop reason: HOSPADM

## 2017-09-22 RX ORDER — TERBUTALINE SULFATE 1 MG/ML
0.25 INJECTION, SOLUTION SUBCUTANEOUS AS NEEDED
Status: DISCONTINUED | OUTPATIENT
Start: 2017-09-22 | End: 2017-09-22 | Stop reason: HOSPADM

## 2017-09-22 RX ORDER — PROMETHAZINE HYDROCHLORIDE 12.5 MG/1
12.5 TABLET ORAL EVERY 6 HOURS PRN
Status: DISCONTINUED | OUTPATIENT
Start: 2017-09-22 | End: 2017-09-22 | Stop reason: HOSPADM

## 2017-09-22 RX ORDER — PROMETHAZINE HYDROCHLORIDE 12.5 MG/1
12.5 SUPPOSITORY RECTAL EVERY 6 HOURS PRN
Status: DISCONTINUED | OUTPATIENT
Start: 2017-09-22 | End: 2017-09-22 | Stop reason: HOSPADM

## 2017-09-22 RX ORDER — DIPHENHYDRAMINE HYDROCHLORIDE 50 MG/ML
12.5 INJECTION INTRAMUSCULAR; INTRAVENOUS EVERY 8 HOURS PRN
Status: DISCONTINUED | OUTPATIENT
Start: 2017-09-22 | End: 2017-09-22 | Stop reason: HOSPADM

## 2017-09-22 RX ORDER — ACETAMINOPHEN 325 MG/1
650 TABLET ORAL EVERY 4 HOURS PRN
Status: DISCONTINUED | OUTPATIENT
Start: 2017-09-22 | End: 2017-09-22 | Stop reason: SDUPTHER

## 2017-09-22 RX ORDER — OXYCODONE AND ACETAMINOPHEN 7.5; 325 MG/1; MG/1
2 TABLET ORAL EVERY 4 HOURS PRN
Status: DISCONTINUED | OUTPATIENT
Start: 2017-09-22 | End: 2017-09-22

## 2017-09-22 RX ORDER — OXYTOCIN/RINGER'S LACTATE 20/1000 ML
999 PLASTIC BAG, INJECTION (ML) INTRAVENOUS ONCE
Status: DISCONTINUED | OUTPATIENT
Start: 2017-09-22 | End: 2017-09-22 | Stop reason: HOSPADM

## 2017-09-22 RX ORDER — DOCUSATE SODIUM 100 MG/1
100 CAPSULE, LIQUID FILLED ORAL 2 TIMES DAILY
Status: DISCONTINUED | OUTPATIENT
Start: 2017-09-22 | End: 2017-09-24 | Stop reason: HOSPADM

## 2017-09-22 RX ORDER — IBUPROFEN 600 MG/1
600 TABLET ORAL EVERY 6 HOURS PRN
Status: DISCONTINUED | OUTPATIENT
Start: 2017-09-22 | End: 2017-09-24 | Stop reason: HOSPADM

## 2017-09-22 RX ORDER — PROMETHAZINE HYDROCHLORIDE 12.5 MG/1
12.5 SUPPOSITORY RECTAL EVERY 6 HOURS PRN
Status: DISCONTINUED | OUTPATIENT
Start: 2017-09-22 | End: 2017-09-24 | Stop reason: HOSPADM

## 2017-09-22 RX ORDER — MORPHINE SULFATE 4 MG/ML
2 INJECTION, SOLUTION INTRAMUSCULAR; INTRAVENOUS
Status: DISCONTINUED | OUTPATIENT
Start: 2017-09-22 | End: 2017-09-22

## 2017-09-22 RX ORDER — LIDOCAINE HYDROCHLORIDE 10 MG/ML
5 INJECTION, SOLUTION INFILTRATION; PERINEURAL AS NEEDED
Status: DISCONTINUED | OUTPATIENT
Start: 2017-09-22 | End: 2017-09-22 | Stop reason: HOSPADM

## 2017-09-22 RX ORDER — OXYTOCIN/RINGER'S LACTATE 20/1000 ML
125 PLASTIC BAG, INJECTION (ML) INTRAVENOUS CONTINUOUS PRN
Status: DISCONTINUED | OUTPATIENT
Start: 2017-09-22 | End: 2017-09-22 | Stop reason: HOSPADM

## 2017-09-22 RX ORDER — MISOPROSTOL 200 UG/1
800 TABLET ORAL AS NEEDED
Status: DISCONTINUED | OUTPATIENT
Start: 2017-09-22 | End: 2017-09-22

## 2017-09-22 RX ORDER — MISOPROSTOL 200 UG/1
800 TABLET ORAL AS NEEDED
Status: DISCONTINUED | OUTPATIENT
Start: 2017-09-22 | End: 2017-09-22 | Stop reason: HOSPADM

## 2017-09-22 RX ORDER — PROMETHAZINE HYDROCHLORIDE 12.5 MG/1
12.5 TABLET ORAL EVERY 6 HOURS PRN
Status: DISCONTINUED | OUTPATIENT
Start: 2017-09-22 | End: 2017-09-22 | Stop reason: SDUPTHER

## 2017-09-22 RX ORDER — PROMETHAZINE HYDROCHLORIDE 12.5 MG/1
12.5 TABLET ORAL EVERY 6 HOURS PRN
Status: DISCONTINUED | OUTPATIENT
Start: 2017-09-22 | End: 2017-09-22

## 2017-09-22 RX ORDER — PROMETHAZINE HYDROCHLORIDE 25 MG/1
25 TABLET ORAL EVERY 6 HOURS PRN
Status: DISCONTINUED | OUTPATIENT
Start: 2017-09-22 | End: 2017-09-24 | Stop reason: HOSPADM

## 2017-09-22 RX ORDER — FAMOTIDINE 10 MG/ML
20 INJECTION, SOLUTION INTRAVENOUS EVERY 12 HOURS
Status: DISCONTINUED | OUTPATIENT
Start: 2017-09-22 | End: 2017-09-22

## 2017-09-22 RX ORDER — OXYTOCIN/RINGER'S LACTATE 20/1000 ML
125 PLASTIC BAG, INJECTION (ML) INTRAVENOUS CONTINUOUS PRN
Status: DISCONTINUED | OUTPATIENT
Start: 2017-09-22 | End: 2017-09-22 | Stop reason: SDUPTHER

## 2017-09-22 RX ADMIN — Medication 125 ML/HR: at 20:20

## 2017-09-22 RX ADMIN — LIDOCAINE HYDROCHLORIDE AND EPINEPHRINE 3 ML: 15; 5 INJECTION, SOLUTION EPIDURAL at 09:19

## 2017-09-22 RX ADMIN — Medication 999 ML/HR: at 18:47

## 2017-09-22 RX ADMIN — ONDANSETRON 4 MG: 2 INJECTION INTRAMUSCULAR; INTRAVENOUS at 18:20

## 2017-09-22 RX ADMIN — BUTORPHANOL TARTRATE 1 MG: 2 INJECTION, SOLUTION INTRAMUSCULAR; INTRAVENOUS at 04:12

## 2017-09-22 RX ADMIN — Medication 5 MG: at 10:46

## 2017-09-22 RX ADMIN — ROPIVACAINE HYDROCHLORIDE 16 ML/HR: 5 INJECTION, SOLUTION EPIDURAL; INFILTRATION; PERINEURAL at 09:19

## 2017-09-22 RX ADMIN — FAMOTIDINE 20 MG: 10 INJECTION, SOLUTION INTRAVENOUS at 09:44

## 2017-09-22 RX ADMIN — HYDROCODONE BITARTRATE AND ACETAMINOPHEN 1 TABLET: 5; 325 TABLET ORAL at 21:45

## 2017-09-22 RX ADMIN — ACETAMINOPHEN 650 MG: 325 TABLET, FILM COATED ORAL at 11:47

## 2017-09-22 RX ADMIN — SODIUM CHLORIDE, POTASSIUM CHLORIDE, SODIUM LACTATE AND CALCIUM CHLORIDE 125 ML/HR: 600; 310; 30; 20 INJECTION, SOLUTION INTRAVENOUS at 02:40

## 2017-09-22 RX ADMIN — SODIUM CHLORIDE, POTASSIUM CHLORIDE, SODIUM LACTATE AND CALCIUM CHLORIDE 125 ML/HR: 600; 310; 30; 20 INJECTION, SOLUTION INTRAVENOUS at 10:52

## 2017-09-22 RX ADMIN — SODIUM CHLORIDE, POTASSIUM CHLORIDE, SODIUM LACTATE AND CALCIUM CHLORIDE 125 ML/HR: 600; 310; 30; 20 INJECTION, SOLUTION INTRAVENOUS at 09:03

## 2017-09-22 RX ADMIN — BUTORPHANOL TARTRATE 1 MG: 2 INJECTION, SOLUTION INTRAMUSCULAR; INTRAVENOUS at 07:35

## 2017-09-22 RX ADMIN — BUTORPHANOL TARTRATE 1 MG: 2 INJECTION, SOLUTION INTRAMUSCULAR; INTRAVENOUS at 06:17

## 2017-09-22 RX ADMIN — ONDANSETRON 4 MG: 2 INJECTION INTRAMUSCULAR; INTRAVENOUS at 11:42

## 2017-09-22 RX ADMIN — ROPIVACAINE HYDROCHLORIDE 10 ML: 5 INJECTION, SOLUTION EPIDURAL; INFILTRATION; PERINEURAL at 09:19

## 2017-09-22 RX ADMIN — IBUPROFEN 600 MG: 600 TABLET, FILM COATED ORAL at 20:43

## 2017-09-22 RX ADMIN — SODIUM CHLORIDE, POTASSIUM CHLORIDE, SODIUM LACTATE AND CALCIUM CHLORIDE 1000 ML: 600; 310; 30; 20 INJECTION, SOLUTION INTRAVENOUS at 08:35

## 2017-09-22 RX ADMIN — FENTANYL CITRATE 100 MCG: 50 INJECTION, SOLUTION INTRAMUSCULAR; INTRAVENOUS at 09:19

## 2017-09-22 NOTE — L&D DELIVERY NOTE
2017    Patient:Lazara Sanchez    MR#:[id number]    Vaginal Delivery Note  32 y.o. yo female  at 39w3d    Patient Active Problem List   Diagnosis   • Pelvic pain affecting pregnancy in second trimester, antepartum   • Vaginal bleeding before 22 weeks gestation   • Bacterial vaginosis   • Abdominal pain affecting pregnancy, antepartum       Delivery     Delivery:       YOB: 2017    Time of Birth: 6:42 PM      Anesthesia: Epidural     Delivering clinician:      Forceps?   No   Vacuum? No    Shoulder dystocia present: No          Infant    Findings: female  infant     Infant observations: Weight: No birth weight on file.     Observations/Comments:         Apgars:    @ 1 minute /       @ 5 minutes         Placenta, Cord, and Fluid    Placenta delivered     at        Cord:    present.   Nuchal Cord?  no   Cord blood obtained:      Cord gases obtained:       Cord gas results: Pending         Repair    Episiotomy: No   Lacerations: Yes  Laceration Information  Laceration Repaired?   Perineal:   1st   yes   Periurethral:         Labial:         Sulcus:         Vaginal:         Cervical:              Estimated Blood Loss:   300 mls.   Suture used for repair: 2-0 Vicryl      Complications  none    Disposition  Mother to Mother Baby/Postpartum  in stable condition currently.  Baby to NICU  in stable condition currently.        +meconium, DRT present, baby requiring O2 support so taken to transition nursery          Lisa Belle MD  17  7:06 PM

## 2017-09-22 NOTE — PLAN OF CARE
Problem: Patient Care Overview (Adult)  Goal: Plan of Care Review  Outcome: Ongoing (interventions implemented as appropriate)    09/22/17 0630   Coping/Psychosocial Response Interventions   Plan Of Care Reviewed With patient       Goal: Adult Individualization and Mutuality  Outcome: Ongoing (interventions implemented as appropriate)  Goal: Discharge Needs Assessment  Outcome: Ongoing (interventions implemented as appropriate)    09/22/17 0630   Discharge Needs Assessment   Concerns To Be Addressed no discharge needs identified         Problem: Labor (Cervical Ripen, Induct, Augment) (Adult,Obstetrics,Pediatric)  Goal: Signs and Symptoms of Listed Potential Problems Will be Absent or Manageable (Labor)  Outcome: Ongoing (interventions implemented as appropriate)    09/22/17 0630   Labor (Cervical Ripen, Induct, Augment)   Problems Assessed (Labor) all   Problems Present (Labor) none

## 2017-09-22 NOTE — PLAN OF CARE
Problem: Labor (Cervical Ripen, Induct, Augment) (Adult,Obstetrics,Pediatric)  Goal: Signs and Symptoms of Listed Potential Problems Will be Absent or Manageable (Labor)    09/22/17 1025   Labor (Cervical Ripen, Induct, Augment)   Problems Assessed (Labor) all   Problems Present (Labor) none

## 2017-09-23 LAB
BASOPHILS # BLD AUTO: 0.04 10*3/MM3 (ref 0–0.2)
BASOPHILS NFR BLD AUTO: 0.2 % (ref 0–1)
DEPRECATED RDW RBC AUTO: 47.6 FL (ref 37–54)
EOSINOPHIL # BLD AUTO: 0.32 10*3/MM3 (ref 0–0.3)
EOSINOPHIL NFR BLD AUTO: 2 % (ref 0–3)
ERYTHROCYTE [DISTWIDTH] IN BLOOD BY AUTOMATED COUNT: 14.5 % (ref 11.3–14.5)
HCT VFR BLD AUTO: 28.5 % (ref 34.5–44)
HGB BLD-MCNC: 9.4 G/DL (ref 11.5–15.5)
IMM GRANULOCYTES # BLD: 0.08 10*3/MM3 (ref 0–0.03)
IMM GRANULOCYTES NFR BLD: 0.5 % (ref 0–0.6)
LYMPHOCYTES # BLD AUTO: 2.56 10*3/MM3 (ref 0.6–4.8)
LYMPHOCYTES NFR BLD AUTO: 15.9 % (ref 24–44)
MCH RBC QN AUTO: 29.4 PG (ref 27–31)
MCHC RBC AUTO-ENTMCNC: 33 G/DL (ref 32–36)
MCV RBC AUTO: 89.1 FL (ref 80–99)
MONOCYTES # BLD AUTO: 0.87 10*3/MM3 (ref 0–1)
MONOCYTES NFR BLD AUTO: 5.4 % (ref 0–12)
NEUTROPHILS # BLD AUTO: 12.22 10*3/MM3 (ref 1.5–8.3)
NEUTROPHILS NFR BLD AUTO: 76 % (ref 41–71)
PLATELET # BLD AUTO: 183 10*3/MM3 (ref 150–450)
PMV BLD AUTO: 10.5 FL (ref 6–12)
RBC # BLD AUTO: 3.2 10*6/MM3 (ref 3.89–5.14)
WBC NRBC COR # BLD: 16.09 10*3/MM3 (ref 3.5–10.8)

## 2017-09-23 PROCEDURE — 85025 COMPLETE CBC W/AUTO DIFF WBC: CPT | Performed by: OBSTETRICS & GYNECOLOGY

## 2017-09-23 RX ORDER — LANOLIN 100 %
OINTMENT (GRAM) TOPICAL AS NEEDED
Status: DISCONTINUED | OUTPATIENT
Start: 2017-09-23 | End: 2017-09-24 | Stop reason: HOSPADM

## 2017-09-23 RX ADMIN — IBUPROFEN 600 MG: 600 TABLET, FILM COATED ORAL at 12:39

## 2017-09-23 RX ADMIN — DOCUSATE SODIUM 100 MG: 100 CAPSULE, LIQUID FILLED ORAL at 12:39

## 2017-09-23 RX ADMIN — IBUPROFEN 600 MG: 600 TABLET, FILM COATED ORAL at 04:10

## 2017-09-23 RX ADMIN — DOCUSATE SODIUM 100 MG: 100 CAPSULE, LIQUID FILLED ORAL at 09:28

## 2017-09-23 NOTE — ANESTHESIA POSTPROCEDURE EVALUATION
Patient: Lazara Sanchez    Procedure Summary     Date Anesthesia Start Anesthesia Stop Room / Location    09/22/17 0909 [id number]        Procedure Diagnosis Scheduled Providers Provider    LABOR ANALGESIA No diagnosis on file.  Carlos Fox MD          Anesthesia Type: epidural  Last vitals  BP       Temp        Pulse       Resp        SpO2          Post Anesthesia Care and Evaluation    Patient location during evaluation: bedside  Patient participation: complete - patient participated  Level of consciousness: awake  Pain score: 0  Pain management: satisfactory to patient  Airway patency: patent  Anesthetic complications: No anesthetic complications    Cardiovascular status: acceptable  Respiratory status: acceptable  Hydration status: acceptable

## 2017-09-23 NOTE — PLAN OF CARE
Problem: Patient Care Overview (Adult)  Goal: Plan of Care Review  Outcome: Ongoing (interventions implemented as appropriate)    17 0501   Coping/Psychosocial Response Interventions   Plan Of Care Reviewed With patient;spouse   Patient Care Overview   Progress improving   Outcome Evaluation   Outcome Summary/Follow up Plan PARAMJIT ochoa. Pt up ad margo. Pain controlled with PO meds. Has done skin to skin and attempted BFing this shift.       Goal: Adult Individualization and Mutuality  Outcome: Ongoing (interventions implemented as appropriate)  Goal: Discharge Needs Assessment  Outcome: Ongoing (interventions implemented as appropriate)    Problem: Postpartum, Vaginal Delivery (Adult)  Goal: Signs and Symptoms of Listed Potential Problems Will be Absent or Manageable (Postpartum, Vaginal Delivery)  Outcome: Ongoing (interventions implemented as appropriate)    Problem: Breastfeeding (Adult,NICU,,Obstetrics,Pediatric)  Goal: Signs and Symptoms of Listed Potential Problems Will be Absent or Manageable (Breastfeeding)  Outcome: Ongoing (interventions implemented as appropriate)

## 2017-09-23 NOTE — PROGRESS NOTES
9/23/2017  PPD #1    Subjective   Lazara feels well.  Patient describes her lochia less than menses.  Pain is well controlled       Objective   Temp: Temp:  [97.8 °F (36.6 °C)-99 °F (37.2 °C)] 98.2 °F (36.8 °C) Temp src: Oral   BP: BP: ()/(50-81) 88/50        Pulse: Heart Rate:  [] 80  RR: Resp:  [16-20] 16    General:  No acute distress   Abdomen: Fundus firm and beneath umbilicus   Pelvis: deferred     Lab Results   Component Value Date    WBC 16.09 (H) 09/23/2017    HGB 9.4 (L) 09/23/2017    HCT 28.5 (L) 09/23/2017    MCV 89.1 09/23/2017     09/23/2017       Assessment  1. PPD# 1 after vaginal delivery    Plan  1. Routine postpartum care.      This note has been electronically signed.    Kelsea Crystal, APRN  September 23, 2017

## 2017-09-23 NOTE — LACTATION NOTE
This note was copied from a baby's chart.     09/23/17 0945   Maternal Information   Date of Referral 09/23/17   Person Making Referral other (see comments)  (courtesy)   Maternal Reason for Referral breastfeeding currently   Maternal Infant Assessment   Size Issue, Left Breast no   Shape, Left Breast round   Density, Left Breast soft   Nipple, Left graspable   Nipple Condition, Left intact   Nipple Conditions, Right blistered   Infant Assessment   Sucking Reflex present   Rooting Reflex present   Swallow Reflex present   LATCH Score   Latch 1-->repeated attempts, holds nipple in mouth, stimulate to suck   Audible Swallowing 0-->none   Type Of Nipple 2-->everted (after stimulation)   Comfort (Breast/Nipple) 1-->filling, red/small blisters/bruises, mild/mod discomfort   Hold (Positioning) 1-->minimal assist, teach one side: mother does other, staff holds   Score (less than 7 for 2/more consecutive times, consult Lactation Consultant) 5   Maternal Infant Feeding   Previous Breastfeeding History no   Infant Positioning cross-cradle   Feeding Infant   Disengagement Cues sleepy;reluctant to latch   Effective Latch During Feeding no   Equipment Type/Education   Breast Pump Type double electric, personal   Additional Equipment lanolin cream

## 2017-09-23 NOTE — PLAN OF CARE
Problem: Patient Care Overview (Adult)  Goal: Plan of Care Review  Outcome: Ongoing (interventions implemented as appropriate)    17   Coping/Psychosocial Response Interventions   Plan Of Care Reviewed With patient   Patient Care Overview   Progress improving   Outcome Evaluation   Outcome Summary/Follow up Plan inital BF teaching complete, skin to skin promoted and encouraged on demand feeding. Will call for help if needed.         Problem: Breastfeeding (Adult,NICU,,Obstetrics,Pediatric)  Intervention: Promote Breast Care/Comfort    17   Reproductive Interventions   Breast Care: Breastfeeding lanolin to nipple(s) applied       Intervention: Promote Successful Breastfeeding Experience    17   Nutrition Interventions   Breastfeeding Assistance assisted with positioning;feeding cue recognition promoted;feeding session observed;feeding on demand promoted;infant stimulated to wakeful sate;milk expression/pumping;support offered   Maternal Breastfeeding Support encouragement offered;lactation counseling provided       Intervention: Promote Effective Breastfeeding    17   Nutrition Interventions   Latch Promotion assisted with positioning;moved infant to breast;suck stimulated with colostrum drop         Goal: Signs and Symptoms of Listed Potential Problems Will be Absent or Manageable (Breastfeeding)  Outcome: Ongoing (interventions implemented as appropriate)    17   Breastfeeding   Problems Assessed (Breastfeeding) all   Problems Present (Breastfeeding) none

## 2017-09-24 ENCOUNTER — APPOINTMENT (OUTPATIENT)
Dept: LABOR AND DELIVERY | Facility: HOSPITAL | Age: 32
End: 2017-09-24

## 2017-09-24 VITALS
HEART RATE: 76 BPM | RESPIRATION RATE: 16 BRPM | TEMPERATURE: 98 F | BODY MASS INDEX: 28.09 KG/M2 | DIASTOLIC BLOOD PRESSURE: 75 MMHG | WEIGHT: 179 LBS | HEIGHT: 67 IN | SYSTOLIC BLOOD PRESSURE: 108 MMHG

## 2017-09-24 PROBLEM — B96.89 BACTERIAL VAGINOSIS: Status: RESOLVED | Noted: 2017-05-07 | Resolved: 2017-09-24

## 2017-09-24 PROBLEM — O26.892 PELVIC PAIN AFFECTING PREGNANCY IN SECOND TRIMESTER, ANTEPARTUM: Status: RESOLVED | Noted: 2017-05-07 | Resolved: 2017-09-24

## 2017-09-24 PROBLEM — R10.9 ABDOMINAL PAIN AFFECTING PREGNANCY, ANTEPARTUM: Status: RESOLVED | Noted: 2017-07-11 | Resolved: 2017-09-24

## 2017-09-24 PROBLEM — R10.2 PELVIC PAIN AFFECTING PREGNANCY IN SECOND TRIMESTER, ANTEPARTUM: Status: RESOLVED | Noted: 2017-05-07 | Resolved: 2017-09-24

## 2017-09-24 PROBLEM — N76.0 BACTERIAL VAGINOSIS: Status: RESOLVED | Noted: 2017-05-07 | Resolved: 2017-09-24

## 2017-09-24 PROBLEM — O20.9 VAGINAL BLEEDING BEFORE 22 WEEKS GESTATION: Status: RESOLVED | Noted: 2017-05-07 | Resolved: 2017-09-24

## 2017-09-24 PROBLEM — O26.899 ABDOMINAL PAIN AFFECTING PREGNANCY, ANTEPARTUM: Status: RESOLVED | Noted: 2017-07-11 | Resolved: 2017-09-24

## 2017-09-24 RX ORDER — IBUPROFEN 600 MG/1
600 TABLET ORAL EVERY 6 HOURS PRN
Qty: 25 TABLET | Refills: 2 | Status: SHIPPED | OUTPATIENT
Start: 2017-09-24 | End: 2020-06-25

## 2017-09-24 RX ORDER — OXYCODONE HYDROCHLORIDE AND ACETAMINOPHEN 5; 325 MG/1; MG/1
1 TABLET ORAL EVERY 4 HOURS PRN
Qty: 24 TABLET | Refills: 0 | Status: SHIPPED | OUTPATIENT
Start: 2017-09-24 | End: 2017-10-02

## 2017-09-24 RX ADMIN — DOCUSATE SODIUM 100 MG: 100 CAPSULE, LIQUID FILLED ORAL at 08:04

## 2017-09-24 RX ADMIN — IBUPROFEN 600 MG: 600 TABLET, FILM COATED ORAL at 00:48

## 2017-09-24 RX ADMIN — IBUPROFEN 600 MG: 600 TABLET, FILM COATED ORAL at 08:04

## 2017-09-24 NOTE — LACTATION NOTE
This note was copied from a baby's chart.     09/24/17 1125   Maternal Information   Date of Referral 09/24/17   Person Making Referral other (see comments)  (courtesy consult)   Maternal Reason for Referral latch painful  (painful throughout feeding & nipples damaged)   Maternal Infant Assessment   Size Issue, Bilateral Breasts no   Shape, Bilateral Breasts round   Density, Bilateral Breasts soft   Areola, Bilateral elastic   Nipples, Bilateral everted   Nipple Conditions, Bilateral abraded;blistered;bruised;erythema;painful;tender   Signs/Symptoms of Infection, Bilateral none   Additional Documentation (Latch) LATCH Score (Group)   LATCH Score   Latch 0-->too sleepy or reluctant, no latch achieved   Audible Swallowing 0-->none   Type Of Nipple 2-->everted (after stimulation)   Comfort (Breast/Nipple) 1-->filling, red/small blisters/bruises, mild/mod discomfort   Hold (Positioning) 1-->minimal assist, teach one side: mother does other, staff holds   Score (less than 7 for 2/more consecutive times, consult Lactation Consultant) 4   Attempted feeding at breast but too sleepy to latch. Demonstrated cross cradle hold and encouraged more support for baby and mom's breast during feeding. Left mom and baby in skin to skin.    Discussed pumping after several feedings per day, if nipple pain continues and not able to adjust latch. Discussed latch, position, breast and nipple care, demand feeding, milk supply, void and stool diapers, Breast pump use, FU pediatrician visit, skin to skin, importance of [facility] if nipple pain does not resolve. Mom and father of baby expressed verbal understanding.

## 2017-09-24 NOTE — DISCHARGE SUMMARY
Discharge Summary    Date of Admission: 2017  Date of Discharge:  2017      Patient: Lazara Sanchez      MR#:[id number]    Delivery Provider: Lisa Belle     Discharge Surgeon/OB: Rima Fraser    Presenting Problem/History of Present Illness  Currently pregnant [Z33.1]     Patient Active Problem List   Diagnosis   (none) - all problems resolved or deleted         Discharge Diagnosis: Vaginal delivery at 39w3d    Procedures:  Vaginal, Spontaneous Delivery     2017    6:42 PM        Discharge Date: 2017;     Hospital Course  Patient is a 32 y.o. female  at 39w3d status post vaginal delivery without complication. Postpartum the patient did well. She remained afebrile, with vital signs stable. She was ready for discharge on postpartum day 2.     Infant:   female  fetus 8 lb 0.4 oz (3640 g)  with Apgar scores of 7  , 8   at five minutes.    Condition on Discharge:  Stable    Vital Signs  Temp:  [97.8 °F (36.6 °C)-98.4 °F (36.9 °C)] 98 °F (36.7 °C)  Heart Rate:  [76-84] 76  Resp:  [16] 16  BP: ()/(54-75) 108/75    Lab Results   Component Value Date    WBC 16.09 (H) 2017    HGB 9.4 (L) 2017    HCT 28.5 (L) 2017    MCV 89.1 2017     2017       Discharge Disposition  Home or Self Care    Discharge Medications   Lazara Sanchez   Home Medication Instructions [id number]    Printed on:17 1332   Medication Information                      calcium carbonate (TUMS) 500 MG chewable tablet  Chew 1 tablet As Needed for Indigestion or Heartburn.             famotidine (PEPCID) 20 MG tablet  Take 20 mg by mouth 2 (Two) Times a Day.             ferrous sulfate 325 (65 FE) MG tablet  Take 325 mg by mouth Daily With Breakfast.             fexofenadine (ALLEGRA) 30 MG tablet  Take 30 mg by mouth 2 (Two) Times a Day.             ibuprofen (ADVIL,MOTRIN) 600 MG tablet  Take 1 tablet by mouth Every 6 (Six) Hours As Needed for Mild Pain .              oxyCODONE-acetaminophen (PERCOCET) 5-325 MG per tablet  Take 1 tablet by mouth Every 4 (Four) Hours As Needed for Severe Pain  for up to 8 days.             Prenatal Vit-Fe Fumarate-FA (PRENATAL, CLASSIC, VITAMIN) 28-0.8 MG tablet tablet  Take  by mouth Daily.             raNITIdine (ZANTAC) 150 MG tablet  Take 150 mg by mouth 2 (Two) Times a Day.                 Discharge Diet:     Activity at Discharge:     Follow-up Appointments  No future appointments.      Leann Watts MD  09/24/17  1:32 PM  [facility]

## 2017-09-24 NOTE — LACTATION NOTE
"   09/24/17 1230   Maternal Information   Date of Referral 09/24/17   Person Making Referral patient   Maternal Reason for Referral latch painful   Maternal Infant Assessment   Size Issue, Bilateral Breasts no   Shape, Bilateral Breasts round   Density, Bilateral Breasts soft   Areola, Bilateral elastic   Nipples, Bilateral everted   Nipple Conditions, Right abraded;blistered;bruised;erythema;painful;tender   Signs/Symptoms of Infection, Bilateral none   Maternal Infant Feeding   Maternal Emotional State relaxed   Infant Positioning clutch/\"football\"  (changed from cross cradle to football hold on left breast)   Presence of Pain yes   Pain Location nipple, left   Nipple Shape After Feeding, Left Breast pinched;compression stripe   Latch Assistance yes   Feeding Infant   Feeding Readiness Cues eager;hand to mouth movements;rooting;smacking   Effective Latch During Feeding yes  (latched w/o pain with medium nipple shield)   Changed position from cross cradle hold to football hold on left breast. Baby able to latch without pain to mom. Baby tongue thrusting and biting on oral evaluation. Encouraged skin to skin. Demonstrated finger suck training--can try at home between feedings. It may help to improve breastfeeding.    Reviewed breastfeeding topics--demand/cluster feeding if nipple pain resolves; Using breast pump, if nipple pain returns or nipples not improving; void and stool diapers; milk supply; nipple shield use; fu with [facility] if not improving or getting worse, or if unable to get baby off the shield in 1-2 weeks, Mom and dad expressed verbal understanding  "

## 2017-10-16 ENCOUNTER — HOSPITAL ENCOUNTER (OUTPATIENT)
Dept: LACTATION | Facility: HOSPITAL | Age: 32
Discharge: HOME | End: 2017-10-16

## 2017-10-16 NOTE — LACTATION NOTE
10/16/17 1400   Maternal Information   Date of Referral 10/16/17   Person Making Referral patient   Maternal Reason for Referral breastfeeding currently;latch painful   Maternal Information   Language Assistance Needed no   Maternal  Assessment   Size Issue, Bilateral Breasts no   Shape, Bilateral Breasts round   Density, Bilateral Breasts full   Nipples, Bilateral everted   Nipple Condition, Left erythema;tender   Nipple Conditions, Right intact   Additional Documentation (Latch) LATCH Score (Group)   Infant Assessment   Mouth Size average   Tongue Symptoms intact;moist;pink   Frenulum normal   Gum Symptoms intact;moist;pink   Sucking Reflex present   Rooting Reflex present   Swallow Reflex present   Skin Color pink   Tone (Musculoskeletal) appropriate for gestational age   Number of Stools (24 hours) 4   Number of Voids (24 hours) 8   LATCH Score   Latch 2-->grasps breast, tongue down, lips flanged, rhythmic sucking   Audible Swallowing 2-->spontaneous and intermittent (24 hrs old)   Type Of Nipple 2-->everted (after stimulation)   Comfort (Breast/Nipple) 1-->filling, red/small blisters/bruises, mild/mod discomfort   Hold (Positioning) 1-->minimal assist, teach one side: mother does other, staff holds   Score (less than 7 for 2/more consecutive times, consult Lactation Consultant) 8   Maternal Infant Feeding   Maternal Emotional State assist needed   Previous Breastfeeding History no   Infant Positioning cross-cradle   Signs of Milk Transfer audible swallow   Presence of Pain yes   Pain Location nipple, left   Pain Description burning   Comfort Measures Before/During Feeding infant position adjusted;latch adjusted   Milk Ejection Reflex present   Comfort Measures Following Feeding air-drying encouraged;expressed milk applied;other (see comments)  (Dr. Thompson's all purpose Nipple cream)   Nipple Shape After Feeding, Left Breast assymetrical   Nipple Shape After Feeding, Right Breast symmetrical   Latch  Assistance yes   Current Delivery Breastfeeding History   Current Breastfeeding History yes   Current Breastfeeding Success successful   Duration of Current Breastfeeding (3 weeks 3 days)   Currently Breastfeeding yes   Current Breastfeeding Problems pain   Feeding Infant   Feeding Readiness Cues crying;rooting   Satiety Cues infant releases breast   Disengagement Cues disinterested   Effective Latch During Feeding yes   Audible Swallow yes   Suck/Swallow Coordination present   Skin-to-Skin Contact During Feeding yes   Pre-Feeding Weight (gm),  3980 gm   Post-Feeding Weight (gm),  4035 gm   Weight Gain/Loss (gm),  55 gm   Equipment Type/Education   Breast Pump Type double electric, hospital grade   Breast Pump Flange Type hard   Breast Pump Flange Size 24 mm   Breast Pumping breasts pumped until emptied;other (see comments)  (2.5 ounces obtained with pumping.)

## 2017-12-27 ENCOUNTER — HOSPITAL ENCOUNTER (OUTPATIENT)
Dept: LACTATION | Facility: HOSPITAL | Age: 32
Discharge: HOME | End: 2017-12-27

## 2017-12-27 NOTE — LACTATION NOTE
12/27/17 1115   Maternal Information   Date of Referral 12/27/17   Person Making Referral patient   Maternal Reason for Referral breastfeeding currently   Infant Reason for Referral weight gain inadequate   Maternal Information   Language Assistance Needed no   Maternal  Assessment   Size Issue, Bilateral Breasts no   Shape, Bilateral Breasts round   Density, Bilateral Breasts soft   Nipples, Bilateral everted   Nipple Conditions, Bilateral tender;other (see comments)  (From cluster feeding)   Additional Documentation (Latch) LATCH Score (Group)   Infant Assessment   Weight Loss (%) (weighed 8-0 at birth, today weighs 10-6.4 at 3 months 2 week)   Mouth Size average   Tongue Symptoms intact;moist;pink   Frenulum normal   Gum Symptoms intact;moist;pink   Sucking Reflex present   Rooting Reflex present   Swallow Reflex present   Skin Color pink, pale   Tone (Musculoskeletal) age-appropriate range of motion   Number of Stools (24 hours) 6  (3)   LATCH Score   Latch 2-->grasps breast, tongue down, lips flanged, rhythmic sucking   Audible Swallowing 2-->spontaneous and intermittent (24 hrs old)   Type Of Nipple 2-->everted (after stimulation)   Comfort (Breast/Nipple) 1-->filling, red/small blisters/bruises, mild/mod discomfort   Hold (Positioning) 2-->no assist from staff, mother able to position/hold infant   Score (less than 7 for 2/more consecutive times, consult Lactation Consultant) 9   Maternal Infant Feeding   Maternal Emotional State assist needed;other (see comments)  (upset over low supply)   Previous Breastfeeding History no   Infant Positioning cross-cradle   Signs of Milk Transfer audible swallow   Presence of Pain yes   Pain Location nipples, bilateral   Comfort Measures Following Feeding other (see comments)  (Dr. Thompson's all purpose nipple cream)   Nipple Shape After Feeding, Left Breast symmetrical   Nipple Shape After Feeding, Right Breast symmetrical   Latch Assistance no   Current Delivery  Breastfeeding History   Current Breastfeeding History yes   Current Breastfeeding Success successful   Duration of Current Breastfeeding (3 months 2 weeks)   Currently Breastfeeding yes   Current Breastfeeding Problems inadequate milk supply;other (see comments)  (Breastfeed/Pump and supplement with own milk for 3-4 days)   Feeding Infant   Feeding Readiness Cues rooting   Satiety Cues other (see comments)  (Still fussy and continues to root after breastfeeding)   Disengagement Cues disinterested   Stress Cues other (see comments)  (Baby is slow to gain weight r/t low milk supply)   Effective Latch During Feeding yes   Audible Swallow yes   Suck/Swallow Coordination present   Skin-to-Skin Contact During Feeding yes   Pre-Feeding Weight (gm),  4720 gm   Post-Feeding Weight (gm),  4780 gm   Weight Gain/Loss (gm),  60 gm   Equipment Type/Education   Breast Pump Type double electric, hospital grade   Breast Pump Flange Type hard   Breast Pump Flange Size 21 mm;24 mm   Breast Pumping breasts pumped until emptied  (Obtained 15 mls with pumping and fed to baby with a slow claire)

## 2018-07-23 ENCOUNTER — HOSPITAL ENCOUNTER (OUTPATIENT)
Dept: GENERAL RADIOLOGY | Facility: HOSPITAL | Age: 33
Discharge: HOME | End: 2018-07-23
Admitting: FAMILY MEDICINE

## 2018-07-23 ENCOUNTER — OFFICE VISIT (OUTPATIENT)
Dept: FAMILY MEDICINE CLINIC | Facility: CLINIC | Age: 33
End: 2018-07-23

## 2018-07-23 VITALS
HEIGHT: 69 IN | TEMPERATURE: 98.2 F | BODY MASS INDEX: 21.42 KG/M2 | OXYGEN SATURATION: 99 % | RESPIRATION RATE: 18 BRPM | SYSTOLIC BLOOD PRESSURE: 108 MMHG | DIASTOLIC BLOOD PRESSURE: 62 MMHG | HEART RATE: 107 BPM | WEIGHT: 144.6 LBS

## 2018-07-23 DIAGNOSIS — M79.674 PAIN OF TOE OF RIGHT FOOT: ICD-10-CM

## 2018-07-23 PROCEDURE — 99203 OFFICE O/P NEW LOW 30 MIN: CPT | Performed by: FAMILY MEDICINE

## 2018-07-23 PROCEDURE — 73660 X-RAY EXAM OF TOE(S): CPT

## 2018-07-23 RX ORDER — SERTRALINE HYDROCHLORIDE 25 MG/1
25 TABLET, FILM COATED ORAL DAILY
Qty: 30 TABLET | Refills: 5 | Status: SHIPPED | OUTPATIENT
Start: 2018-07-23 | End: 2018-08-09 | Stop reason: SDUPTHER

## 2018-07-23 RX ORDER — LEVONORGESTREL AND ETHINYL ESTRADIOL 0.1-0.02MG
1 KIT ORAL DAILY
COMMUNITY
End: 2020-06-25

## 2018-07-23 RX ORDER — MELATONIN 3 MG
1 LOZENGE ORAL AS NEEDED
COMMUNITY

## 2018-07-23 NOTE — PATIENT INSTRUCTIONS
[facility] have inpatient psych services.

## 2018-07-23 NOTE — PROGRESS NOTES
Lazara Sanchez presents today to establish care.    Chief Complaint   Patient presents with   • Establish Care   • Toe Pain     Right        Toe Pain    The incident occurred 3 to 5 days ago. There was no injury mechanism. The pain is present in the right toes. Quality: dull. The pain has been intermittent since onset. Pertinent negatives include no inability to bear weight. Exacerbated by: walking. She has tried nothing for the symptoms.   Depression   Visit Type: initial  Onset of symptoms: 1 to 6 months ago  Progression since onset: rapidly worsening  Patient presents with the following symptoms: anhedonia, depressed mood, feelings of hopelessness, insomnia, nervousness/anxiety and suicidal ideas.  Patient is not experiencing: suicidal planning.  Frequency of symptoms: constantly   Severity: causing significant distress   Sleep quality: poor  Patient has a history of: depression  Treatment tried: counseling (CBT)         PHQ-2/PHQ-9 Depression Screening 7/23/2018   Little interest or pleasure in doing things 3   Feeling down, depressed, or hopeless 3   Trouble falling or staying asleep, or sleeping too much 2   Feeling tired or having little energy 3   Poor appetite or overeating 0   Feeling bad about yourself - or that you are a failure or have let yourself or your family down 3   Trouble concentrating on things, such as reading the newspaper or watching television 2   Moving or speaking so slowly that other people could have noticed. Or the opposite - being so fidgety or restless that you have been moving around a lot more than usual 0   Thoughts that you would be better off dead, or of hurting yourself in some way 3   Total Score 19   If you checked off any problems, how difficult have these problems made it for you to do your work, take care of things at home, or get along with other people? Extremely dIfficult   Diagnosed with post-partum depression at post-partum center by psychologist. Mood changes on and  off since daughter was 3 months old. Worse since June. Nose dive not able to recover from. Started therapy for depression/ anxiety in college in 2007. Treated for a short-time. Went back to therapy in graduate school, 2008 and 2010. Started sertraline for about a year and stopped when couldn't continue medical care. Restarted therapy in 2012 and again last week. Everything feels like monumental task, no energy, difficult to enjoy things, trouble falling asleep, irritable. Daily occurrence wants to get out of here, leave town, or suicide and option. No suicidal plans. Intrusive flashes of thoughts 2 weeks ago.  not around as often. Unreliable family in town. Friend about more. Primary caretaker for her daughter.       Toe pain (comments)  Training for marathon. H/o stress fracture. 7/19/18 walking in athletic sandals, felt pain similar to stress fracture. Worse barefoot. 3-4th toe on right foot, radiates to bottom of foot. No bruising or swelling. Dull pain. No tried Tylenol or ibuprofen. H/o left big toe joint stress fracture followed by podiatrist and treated conservatively in and out of boots, had MRI done and not good blood flow and bone was dead.     Taking daily antacids for years. Currently talking famotidine once a day, up to 2 times a day while pregnant.     Review of Systems   Constitutional: Positive for fatigue.   HENT: Negative.    Eyes: Negative.    Respiratory: Negative.    Cardiovascular: Negative.    Gastrointestinal: Positive for GERD.   Endocrine: Negative.    Genitourinary: Negative.    Musculoskeletal: Positive for arthralgias.   Skin: Positive for rash.        Itching   Neurological: Negative.    Hematological: Negative.    Psychiatric/Behavioral: Positive for sleep disturbance, suicidal ideas and depressed mood. The patient is nervous/anxious and has insomnia.         Past Medical History:   Diagnosis Date   • Anemia     iron   • Heartburn 2008    Takes pepcid everyday for 3 years   •  HPV (human papilloma virus) infection    • Migraine    • Postpartum depression    • Scoliosis     As a kid   • Seasonal allergies         Past Surgical History:   Procedure Laterality Date   • SESAMOIDECTOMY FIRST TOE Left    • WISDOM TOOTH EXTRACTION        Obstetric History:  OB History      Para Term  AB Living    1 1 1     1    SAB TAB Ectopic Molar Multiple Live Births            0 1         Menstrual History:     Patient's last menstrual period was 2018.       Family History   Problem Relation Age of Onset   • Diabetes Paternal Grandfather    • Stroke Paternal Grandmother    • Hypertension Paternal Grandmother    • Breast cancer Maternal Grandmother    • Hypertension Maternal Grandmother    • Melanoma Maternal Grandfather    • Ovarian cancer Maternal Aunt    • Breast cancer Maternal Aunt         Social History     Social History   • Marital status:      Spouse name: N/A   • Number of children: N/A   • Years of education: N/A     Occupational History   • Not on file.     Social History Main Topics   • Smoking status: Never Smoker   • Smokeless tobacco: Never Used   • Alcohol use Yes      Comment: occ   • Drug use: No   • Sexual activity: Yes     Partners: Male     Other Topics Concern   • Not on file     Social History Narrative            Current Outpatient Prescriptions   Medication Sig Dispense Refill   • famotidine (PEPCID) 20 MG tablet Take 20 mg by mouth 2 (Two) Times a Day.     • fexofenadine (ALLEGRA) 30 MG tablet Take 30 mg by mouth 2 (Two) Times a Day.     • ibuprofen (ADVIL,MOTRIN) 600 MG tablet Take 1 tablet by mouth Every 6 (Six) Hours As Needed for Mild Pain . 25 tablet 2   • Melatonin 1 MG/4ML liquid Take 1 mL by mouth As Needed.     • Multiple Vitamins-Minerals (MULTIVITAMIN ADULT PO) Take 1 tablet by mouth Daily.     • calcium carbonate (TUMS) 500 MG chewable tablet Chew 1 tablet As Needed for Indigestion or Heartburn.     • ferrous sulfate 325 (65 FE) MG  "tablet Take 325 mg by mouth Daily With Breakfast.     • Prenatal Vit-Fe Fumarate-FA (PRENATAL, CLASSIC, VITAMIN) 28-0.8 MG tablet tablet Take  by mouth Daily.     • raNITIdine (ZANTAC) 150 MG tablet Take 150 mg by mouth 2 (Two) Times a Day.       No current facility-administered medications for this visit.        Allergies   Allergen Reactions   • Adhesive Tape Rash        Visit Vitals  /62 (BP Location: Right arm, Patient Position: Sitting)   Pulse 107   Temp 98.2 °F (36.8 °C) (Temporal Artery )   Resp 18   Ht 175.7 cm (69.17\")   Wt 65.6 kg (144 lb 9.6 oz)   LMP 07/21/2018   SpO2 99%   Breastfeeding? No   BMI 21.25 kg/m²        Physical Exam   Constitutional: She is oriented to person, place, and time. She appears distressed.   HENT:   Nose: Nose normal.   Mouth/Throat: Oropharynx is clear and moist.   Eyes: Conjunctivae are normal.   Neck: No thyromegaly present.   Cardiovascular: Normal rate and regular rhythm.    No murmur heard.  Pulmonary/Chest: Effort normal and breath sounds normal.   Musculoskeletal: She exhibits no edema.        Lymphadenopathy:     She has no cervical adenopathy.   Neurological: She is alert and oriented to person, place, and time. Gait normal.   Skin: Skin is warm and dry.   Psychiatric: Her speech is normal and behavior is normal. Judgment normal. Cognition and memory are normal. She exhibits a depressed mood ( crying). She expresses suicidal ideation. She expresses no suicidal plans.   Vitals reviewed.            Lazara was seen today for establish care and toe pain.    Diagnoses and all orders for this visit:    Postpartum depression  -     sertraline (ZOLOFT) 25 MG tablet; Take 1 tablet by mouth Daily.  Discussed safety plan.  Recommend emergency planning for care of her daughter and consider inpatient treatment at [facility] or [facility] in [place] for suicidal thoughts.  Additionally remove guns from household.  Keep appointment with counselor.  Start Zoloft 25 mg, " patient prefers to use lowest amount needed.  She is not breast-feeding.  Reassess in 2 weeks  Pain of toe of right foot  -     XR Toe 2+ View Right; Future  Start with x-rays.  If pain persists consider MRI in 4-6 weeks.    Next appointment 2 weeks.

## 2018-08-09 ENCOUNTER — OFFICE VISIT (OUTPATIENT)
Dept: FAMILY MEDICINE CLINIC | Facility: CLINIC | Age: 33
End: 2018-08-09

## 2018-08-09 VITALS
HEIGHT: 69 IN | SYSTOLIC BLOOD PRESSURE: 100 MMHG | DIASTOLIC BLOOD PRESSURE: 60 MMHG | OXYGEN SATURATION: 98 % | RESPIRATION RATE: 18 BRPM | BODY MASS INDEX: 21.03 KG/M2 | HEART RATE: 98 BPM | WEIGHT: 142 LBS | TEMPERATURE: 97.6 F

## 2018-08-09 DIAGNOSIS — M79.674 PAIN OF TOE OF RIGHT FOOT: ICD-10-CM

## 2018-08-09 DIAGNOSIS — K21.9 GASTROESOPHAGEAL REFLUX DISEASE, ESOPHAGITIS PRESENCE NOT SPECIFIED: ICD-10-CM

## 2018-08-09 PROCEDURE — 99214 OFFICE O/P EST MOD 30 MIN: CPT | Performed by: FAMILY MEDICINE

## 2018-08-09 RX ORDER — SERTRALINE HYDROCHLORIDE 25 MG/1
25 TABLET, FILM COATED ORAL DAILY
Qty: 90 TABLET | Refills: 1 | Status: SHIPPED | OUTPATIENT
Start: 2018-08-09 | End: 2018-12-20

## 2018-08-09 NOTE — PROGRESS NOTES
Chief Complaint   Patient presents with   • Depression     2 wk f/u        Depression   Visit Type: follow-up  Patient presents with the following symptoms: depressed mood.  Patient is not experiencing: weight gain.  Compliance with medications:  %  Side effects:  Headaches   Started couple counseling and individual counseling. A few days had headache, but it has gone away.     PHQ-2/PHQ-9 Depression Screening 8/9/2018   Little interest or pleasure in doing things 1   Feeling down, depressed, or hopeless 1   Trouble falling or staying asleep, or sleeping too much 0   Feeling tired or having little energy 1   Poor appetite or overeating 0   Feeling bad about yourself - or that you are a failure or have let yourself or your family down 1   Trouble concentrating on things, such as reading the newspaper or watching television 1   Moving or speaking so slowly that other people could have noticed. Or the opposite - being so fidgety or restless that you have been moving around a lot more than usual 0   Thoughts that you would be better off dead, or of hurting yourself in some way 1   Total Score 6   If you checked off any problems, how difficult have these problems made it for you to do your work, take care of things at home, or get along with other people? Somewhat difficult       Heartburn:  Taking pepcid for past 5 years. Trigger tomato based foods if she doesn't take medication. Awful heartburn during pregnancy. Almost no symptoms on medication.     Foot pain:  Right 3-4th toe joint pain. Improving. Wearing tennis shoes. Previously running 4 miles a day.     Review of Systems   Constitutional: Negative for unexpected weight gain.   Gastrointestinal: Positive for GERD.   Musculoskeletal:        Foot pain   Psychiatric/Behavioral: Positive for depressed mood.          Current Outpatient Prescriptions:   •  famotidine (PEPCID) 20 MG tablet, Take 20 mg by mouth 2 (Two) Times a Day., Disp: , Rfl:   •  fexofenadine  (ALLEGRA) 30 MG tablet, Take 30 mg by mouth 2 (Two) Times a Day., Disp: , Rfl:   •  ibuprofen (ADVIL,MOTRIN) 600 MG tablet, Take 1 tablet by mouth Every 6 (Six) Hours As Needed for Mild Pain ., Disp: 25 tablet, Rfl: 2  •  levonorgestrel-ethinyl estradiol (AVIANE,ALESSE,LESSINA) 0.1-20 MG-MCG per tablet, Take 1 tablet by mouth Daily., Disp: , Rfl:   •  Melatonin 1 MG/4ML liquid, Take 1 mL by mouth As Needed., Disp: , Rfl:   •  Multiple Vitamins-Minerals (MULTIVITAMIN ADULT PO), Take 1 tablet by mouth Daily., Disp: , Rfl:   •  sertraline (ZOLOFT) 25 MG tablet, Take 1 tablet by mouth Daily., Disp: 90 tablet, Rfl: 1     Allergies   Allergen Reactions   • Adhesive Tape Rash       Past Medical History:   Diagnosis Date   • Anemia     iron   • Heartburn 2008    Takes pepcid everyday for 3 years   • HPV (human papilloma virus) infection    • Migraine    • Postpartum depression    • Scoliosis     As a kid   • Seasonal allergies         Past Surgical History:   Procedure Laterality Date   • SESAMOIDECTOMY FIRST TOE Left    • WISDOM TOOTH EXTRACTION          Family History   Problem Relation Age of Onset   • Diabetes Paternal Grandfather    • Stroke Paternal Grandmother    • Hypertension Paternal Grandmother    • Breast cancer Maternal Grandmother    • Hypertension Maternal Grandmother    • Melanoma Maternal Grandfather    • Ovarian cancer Maternal Aunt    • Breast cancer Maternal Aunt         Social History     Social History   • Marital status:      Spouse name: N/A   • Number of children: N/A   • Years of education: N/A     Occupational History   • Not on file.     Social History Main Topics   • Smoking status: Never Smoker   • Smokeless tobacco: Never Used   • Alcohol use Yes      Comment: occ   • Drug use: No   • Sexual activity: Yes     Partners: Male     Other Topics Concern   • Not on file     Social History Narrative            Visit Vitals  /60   Pulse 98   Temp 97.6 °F (36.4 °C) (Temporal Artery  ")   Resp 18   Ht 175.7 cm (69.17\")   Wt 64.4 kg (142 lb)   LMP 07/21/2018   SpO2 98%   BMI 20.86 kg/m²        Physical Exam   Constitutional: No distress.   Cardiovascular: Normal rate and regular rhythm.    No murmur heard.  Pulmonary/Chest: Effort normal and breath sounds normal.   Abdominal: Soft. There is no tenderness.        Psychiatric: She has a normal mood and affect. Her behavior is normal. Judgment and thought content normal.   Vitals reviewed.           Lazara was seen today for depression.    Diagnoses and all orders for this visit:    Postpartum depression  -     sertraline (ZOLOFT) 25 MG tablet; Take 1 tablet by mouth Daily.  Improved.  Continue counseling and Zoloft.  Pain of toe of right foot  Reviewed x-rays normal.  Patient has prior foot problems status post surgery and is a runner.  At risk for stress fracture.  If pain does not improve within 6 weeks for follow-up with MRI.  Gastroesophageal reflux disease, esophagitis presence not specified  Stable.  Discussed using Pepcid on an as needed basis to see if she is able to wean off medication.       Return in about 3 months (around 11/9/2018) for Follow-up depression.  "

## 2018-12-20 ENCOUNTER — OFFICE VISIT (OUTPATIENT)
Dept: FAMILY MEDICINE CLINIC | Facility: CLINIC | Age: 33
End: 2018-12-20

## 2018-12-20 VITALS
DIASTOLIC BLOOD PRESSURE: 72 MMHG | WEIGHT: 157 LBS | SYSTOLIC BLOOD PRESSURE: 122 MMHG | HEART RATE: 82 BPM | BODY MASS INDEX: 23.25 KG/M2 | OXYGEN SATURATION: 98 % | HEIGHT: 69 IN

## 2018-12-20 DIAGNOSIS — R12 HEARTBURN: ICD-10-CM

## 2018-12-20 PROCEDURE — 99213 OFFICE O/P EST LOW 20 MIN: CPT | Performed by: FAMILY MEDICINE

## 2018-12-20 NOTE — PROGRESS NOTES
Chief Complaint   Patient presents with   • Depression        Depression   Visit Type: follow-up  Patient presents with the following symptoms: depressed mood.  Patient is not experiencing: anhedonia.  Frequency of symptoms: rarely        Seeing counselor every 6 weeks. Back to functional. Stopped zoloft 3-4 weeks ago. Mood is not bad. Some days feels all little overwhelming.     PHQ-2/PHQ-9 Depression Screening 12/20/2018   Little interest or pleasure in doing things 0   Feeling down, depressed, or hopeless 1   Trouble falling or staying asleep, or sleeping too much -   Feeling tired or having little energy -   Poor appetite or overeating -   Feeling bad about yourself - or that you are a failure or have let yourself or your family down -   Trouble concentrating on things, such as reading the newspaper or watching television -   Moving or speaking so slowly that other people could have noticed. Or the opposite - being so fidgety or restless that you have been moving around a lot more than usual -   Thoughts that you would be better off dead, or of hurting yourself in some way -   Total Score 1   If you checked off any problems, how difficult have these problems made it for you to do your work, take care of things at home, or get along with other people? -     Foot pain:  Tapered back on running distance, now back to 5K. Not having any current foot pain.    Heartburn:  Tapered off pepcid. Very infrequent heartburn.     Starting with nutritionist in January.     Review of Systems   Gastrointestinal: Positive for GERD.   Musculoskeletal:        No foot pain   Psychiatric/Behavioral: Positive for depressed mood.        Current Outpatient Medications on File Prior to Visit   Medication Sig Dispense Refill   • fexofenadine (ALLEGRA) 30 MG tablet Take 30 mg by mouth 2 (Two) Times a Day.     • ibuprofen (ADVIL,MOTRIN) 600 MG tablet Take 1 tablet by mouth Every 6 (Six) Hours As Needed for Mild Pain . 25 tablet 2   •  levonorgestrel-ethinyl estradiol (AVIANE,ALESSE,LESSINA) 0.1-20 MG-MCG per tablet Take 1 tablet by mouth Daily.     • Melatonin 1 MG/4ML liquid Take 1 mL by mouth As Needed.     • Multiple Vitamins-Minerals (MULTIVITAMIN ADULT PO) Take 1 tablet by mouth Daily.     • [DISCONTINUED] famotidine (PEPCID) 20 MG tablet Take 20 mg by mouth 2 (Two) Times a Day.     • [DISCONTINUED] sertraline (ZOLOFT) 25 MG tablet Take 1 tablet by mouth Daily. 90 tablet 1     No current facility-administered medications on file prior to visit.        Allergies   Allergen Reactions   • Adhesive Tape Rash       Past Medical History:   Diagnosis Date   • Anemia     iron   • Heartburn 2008    Takes pepcid everyday for 3 years   • HPV (human papilloma virus) infection    • Migraine    • Postpartum depression    • Scoliosis     As a kid   • Seasonal allergies         Past Surgical History:   Procedure Laterality Date   • APPENDECTOMY  08/19/2018   • SESAMOIDECTOMY FIRST TOE Left    • WISDOM TOOTH EXTRACTION          Family History   Problem Relation Age of Onset   • Diabetes Paternal Grandfather    • Stroke Paternal Grandmother    • Hypertension Paternal Grandmother    • Breast cancer Maternal Grandmother    • Hypertension Maternal Grandmother    • Melanoma Maternal Grandfather    • Ovarian cancer Maternal Aunt    • Breast cancer Maternal Aunt         Social History     Socioeconomic History   • Marital status:      Spouse name: Not on file   • Number of children: Not on file   • Years of education: Not on file   • Highest education level: Not on file   Social Needs   • Financial resource strain: Not on file   • Food insecurity - worry: Not on file   • Food insecurity - inability: Not on file   • Transportation needs - medical: Not on file   • Transportation needs - non-medical: Not on file   Occupational History   • Occupation: professor     Comment: [employer]   Tobacco Use   • Smoking status: Never Smoker   • Smokeless tobacco: Never  "Used   Substance and Sexual Activity   • Alcohol use: Yes     Comment: occ   • Drug use: No   • Sexual activity: Yes     Partners: Male   Other Topics Concern   • Not on file   Social History Narrative            Visit Vitals  /72 (BP Location: Left arm, Patient Position: Sitting, Cuff Size: Adult)   Pulse 82   Ht 175.3 cm (69\")   Wt 71.2 kg (157 lb)   SpO2 98%   BMI 23.18 kg/m²        Physical Exam   Constitutional: No distress.   Cardiovascular: Normal rate and regular rhythm.   No murmur heard.  Pulmonary/Chest: Effort normal and breath sounds normal.   Psychiatric: She has a normal mood and affect. Her behavior is normal. Judgment and thought content normal.   Vitals reviewed.            Lazara was seen today for depression.    Diagnoses and all orders for this visit:  Prior surgery records requested for appendectomy done at [facility].  Postpartum major depression in remission  Resolve.  Patient is no longer on Zoloft.  She is continued seeing counselor.  Follow up if mood worsens and she wants to restart Zoloft.  Heartburn  Improved.  Patient is able to come off of Pepcid.  Advised as needed use of Pepcid for preventatively if she is going to eat food history of heartburn in the past.      Return if symptoms worsen or fail to improve.  "

## 2019-09-19 NOTE — H&P
History and Physical   [facility]    Chief Complaint   Patient presents with   • Rupture of Membranes     around 0100       Patient Active Problem List   Diagnosis   • Pelvic pain affecting pregnancy in second trimester, antepartum   • Vaginal bleeding before 22 weeks gestation   • Bacterial vaginosis   • Abdominal pain affecting pregnancy, antepartum       Lazara Sanchez is a 32 y.o. year old  with an Estimated Date of Delivery: 17 currently at 39w3d presenting with SROM.    Prenatal care has been with Dr. Belle.  It has been significant for uncomplicated.            The following portions of the patient's history were reviewed and updated as appropriate:vital signs, allergies, current medications, past medical history, past social history, past surgical history and problem list.    PAST MEDICAL HISTORY  Past Medical History:   Diagnosis Date   • Anemia     iron   • Heartburn     Takes pepcid everyday for 3 years   • HPV (human papilloma virus) infection    • Migraine    • Scoliosis     As a kid   • Seasonal allergies        PAST SURGICAL HISTORY  Past Surgical History:   Procedure Laterality Date   • SESAMOIDECTOMY FIRST TOE Left    • WISDOM TOOTH EXTRACTION         ALLERGIES  Adhesive tape    MEDICATIONS    Current Facility-Administered Medications:   •  acetaminophen (TYLENOL) tablet 650 mg, 650 mg, Oral, Q4H PRN, Verona Ferro MD  •  acetaminophen (TYLENOL) tablet 650 mg, 650 mg, Oral, Q4H PRN, Verona Ferro MD  •  butorphanol (STADOL) injection 1 mg, 1 mg, Intravenous, Q2H PRN, Verona Ferro MD  •  butorphanol (STADOL) injection 2 mg, 2 mg, Intravenous, Q2H PRN, Verona Ferro MD  •  carboprost (HEMABATE) injection 250 mcg, 250 mcg, Intramuscular, PRN, Verona Ferro MD  •  famotidine (PEPCID) injection 20 mg, 20 mg, Intravenous, Q12H **OR** famotidine (PEPCID) tablet 20 mg, 20 mg, Oral, Q12H, Verona Ferro MD  •  lactated ringers bolus 1,000 mL, 1,000 mL, Intravenous, Once,  Verona Ferro MD  •  lactated ringers infusion, 125 mL/hr, Intravenous, Continuous, Verona Ferro MD  •  lidocaine (XYLOCAINE) 1 % injection 5 mL, 5 mL, Intradermal, PRN, Verona Ferro MD  •  methylergonovine (METHERGINE) injection 200 mcg, 200 mcg, Intramuscular, Once PRN, Verona Ferro MD  •  misoprostol (CYTOTEC) tablet 800 mcg, 800 mcg, Rectal, PRN, Verona Ferro MD  •  Morphine sulfate (PF) injection 2 mg, 2 mg, Intravenous, Q2H PRN, Verona Ferro MD  •  Morphine sulfate (PF) injection 2 mg, 2 mg, Intravenous, Q2H PRN, Verona Ferro MD  •  oxyCODONE-acetaminophen (PERCOCET) 5-325 MG per tablet 1 tablet, 1 tablet, Oral, Q4H PRN, Verona Ferro MD  •  oxyCODONE-acetaminophen (PERCOCET) 7.5-325 MG per tablet 2 tablet, 2 tablet, Oral, Q4H PRN, Verona Ferro MD  •  [START ON 9/23/2017] oxytocin in lactated ringers 30 UNIT/500ML infusion, 2-24 nick-units/min, Intravenous, Titrated, Verona Ferro MD  •  Oxytocin-Lactated Ringers (PITOCIN) 20 UNIT/L in lactated Ringer's 1000 mL IVPB, 999 mL/hr, Intravenous, Once **FOLLOWED BY** Oxytocin-Lactated Ringers (PITOCIN) 20 UNIT/L in lactated Ringer's 1000 mL IVPB, 125 mL/hr, Intravenous, Continuous PRN, Verona Ferro MD  •  penicillin g 5 mu/100 mL 0.9% NS IVPB (mbp), 5 Million Units, Intravenous, Once **FOLLOWED BY** penicillin G potassium 2.5 Million Units in sodium chloride 0.9 % 50 mL IVPB, 2.5 Million Units, Intravenous, Q4H, Verona Ferro MD  •  promethazine (PHENERGAN) injection 12.5 mg, 12.5 mg, Intravenous, Q6H PRN **OR** promethazine (PHENERGAN) injection 12.5 mg, 12.5 mg, Intramuscular, Q6H PRN **OR** promethazine (PHENERGAN) suppository 12.5 mg, 12.5 mg, Rectal, Q6H PRN **OR** promethazine (PHENERGAN) tablet 12.5 mg, 12.5 mg, Oral, Q6H PRN, Verona Ferro MD  •  promethazine (PHENERGAN) injection 12.5 mg, 12.5 mg, Intravenous, Q6H PRN **OR** promethazine (PHENERGAN) injection 12.5 mg, 12.5 mg, Intramuscular, Q6H PRN **OR** promethazine (PHENERGAN)  "suppository 12.5 mg, 12.5 mg, Rectal, Q6H PRN **OR** promethazine (PHENERGAN) tablet 12.5 mg, 12.5 mg, Oral, Q6H PRN, Verona Ferro MD  •  sodium chloride 0.9 % flush 1-10 mL, 1-10 mL, Intravenous, PRN, Verona Ferro MD  •  terbutaline (BRETHINE) injection 0.25 mg, 0.25 mg, Subcutaneous, PRN, Verona Ferro MD  •  zolpidem (AMBIEN) tablet 5 mg, 5 mg, Oral, Nightly PRN, Verona Ferro MD      Objective     Blood pressure 113/69, pulse 96, temperature 98.1 °F (36.7 °C), temperature source Oral, resp. rate 16, height 67\" (170.2 cm), weight 179 lb (81.2 kg), currently breastfeeding.    Physical Exam    General:  well developed; well nourished  no acute distress           Abdomen: soft, non-tender; no masses  no umbilical or inginual hernias are present       FHT's: reactive and category 1   Cervix: 1-2/50   [place]: Contraction are irregular     Lab Review   Labs: No data reviewed   Lab Results (last 24 hours)     Procedure Component Value Units Date/Time    Group B Streptococcus Culture [[id number]] Resulted:  09/01/17     Specimen:  Swab from Vaginal/Rectum Updated:  09/22/17 0224     External Strep Group B Ag Negative    Chlamydia trachomatis, Neisseria gonorrhoeae, PCR w/ confirmation [[id number]] Resulted:  02/07/17     Specimen:  Swab from Vagina Updated:  09/22/17 0224     External Chlamydia Screen Negative    Gonorrhea Screen [[id number]] Resulted:  02/07/17     Specimen:  Swab Updated:  09/22/17 0224     External Gonorrhea Screen Negative    Urine Drug Screen [[id number]] Resulted:  02/07/17     Specimen:  Urine from Urine, Clean Catch Updated:  09/22/17 0224     External Urine Drug Screen Negative    RPR [[id number]] Resulted:  02/07/17     Specimen:  Blood Updated:  09/22/17 0224     External RPR Negative    Rubella Antibody, IgG [[id number]] Resulted:  02/07/17     Specimen:  Blood Updated:  09/22/17 0224     External Rubella Qual Immune    HIV-1 Antibody, EIA [[id number]] Resulted:  02/07/17     Specimen:  Blood " Updated:  09/22/17 0224     External HIV-1 Antibody Negative          Imaging   No data reviewed   Imaging Results (last 24 hours)     ** No results found for the last 24 hours. **          Assessment/Plan     ASSESSMENT  1. IUP at 39w3d  2. SROM  3. Group B strep status: negative                 PLAN  1. Admit for delivery.  Oxytocin if needed.         Verona Ferro MD  9/22/20172:25 AM   negative - no chest pain

## 2020-01-10 NOTE — ANESTHESIA PROCEDURE NOTES
Labor Epidural    Patient location during procedure: OB  Start Time: 9/22/2017 9:09 AM  Indication:at surgeon's request  Performed By  Anesthesiologist: LARISA ULLOA  Preanesthetic Checklist  Completed: patient identified, site marked, surgical consent, pre-op evaluation, timeout performed, risks and benefits discussed and monitors and equipment checked  Prep:  Pt Position:sitting  Sterile Tech:cap, gloves, mask and sterile barrier  Prep:alcohol swabs  Monitoring:blood pressure monitoring  Epidural Block Procedure:  Approach:midline  Guidance:landmark technique  Location:L3-L4  Needle Type:Tuohy  Needle Gauge:17 G  Loss of Resistance Medium: air  Loss of Resistance: 5cm  Cath Depth at skin:10 cm  Paresthesia: none  Aspiration:negative  Test Dose:negative  Number of Attempts: 1  Post Assessment:  Dressing:occlusive dressing applied and secured with tape  Pt Tolerance:patient tolerated the procedure well with no apparent complications  Complications:no            
Ophthalmology
Neurology

## 2020-05-26 ENCOUNTER — TRANSCRIBE ORDERS (OUTPATIENT)
Dept: WOMENS IMAGING | Facility: HOSPITAL | Age: 35
End: 2020-05-26

## 2020-05-26 DIAGNOSIS — Z36.89 SCREENING, ANTENATAL, FOR FETAL ANATOMIC SURVEY: ICD-10-CM

## 2020-05-26 DIAGNOSIS — O09.522 AMA (ADVANCED MATERNAL AGE) MULTIGRAVIDA 35+, SECOND TRIMESTER: Primary | ICD-10-CM

## 2020-06-25 ENCOUNTER — HOSPITAL ENCOUNTER (OUTPATIENT)
Dept: WOMENS IMAGING | Facility: HOSPITAL | Age: 35
Discharge: HOME | End: 2020-06-25
Admitting: OBSTETRICS & GYNECOLOGY

## 2020-06-25 ENCOUNTER — OFFICE VISIT (OUTPATIENT)
Dept: OBSTETRICS AND GYNECOLOGY | Facility: HOSPITAL | Age: 35
End: 2020-06-25

## 2020-06-25 VITALS — DIASTOLIC BLOOD PRESSURE: 52 MMHG | WEIGHT: 152 LBS | SYSTOLIC BLOOD PRESSURE: 99 MMHG | BODY MASS INDEX: 22.45 KG/M2

## 2020-06-25 DIAGNOSIS — O09.529 ANTEPARTUM MULTIGRAVIDA OF ADVANCED MATERNAL AGE: Primary | ICD-10-CM

## 2020-06-25 DIAGNOSIS — Z34.90 PREGNANCY, UNSPECIFIED GESTATIONAL AGE: ICD-10-CM

## 2020-06-25 DIAGNOSIS — Z36.89 SCREENING, ANTENATAL, FOR FETAL ANATOMIC SURVEY: ICD-10-CM

## 2020-06-25 DIAGNOSIS — O09.522 AMA (ADVANCED MATERNAL AGE) MULTIGRAVIDA 35+, SECOND TRIMESTER: ICD-10-CM

## 2020-06-25 PROCEDURE — 76811 OB US DETAILED SNGL FETUS: CPT

## 2020-06-25 PROCEDURE — 76811 OB US DETAILED SNGL FETUS: CPT | Performed by: OBSTETRICS & GYNECOLOGY

## 2020-06-25 PROCEDURE — 99202 OFFICE O/P NEW SF 15 MIN: CPT | Performed by: OBSTETRICS & GYNECOLOGY

## 2020-06-25 RX ORDER — PNV NO.118/IRON FUMARATE/FA 29 MG-1 MG
TABLET,CHEWABLE ORAL
COMMUNITY
Start: 2020-04-21 | End: 2022-06-22

## 2020-06-25 RX ORDER — FEXOFENADINE HCL 180 MG/1
180 TABLET ORAL DAILY
COMMUNITY

## 2020-06-25 RX ORDER — ACETAMINOPHEN 500 MG
1000 TABLET ORAL EVERY 6 HOURS PRN
COMMUNITY

## 2020-08-28 ENCOUNTER — HOSPITAL ENCOUNTER (OUTPATIENT)
Facility: HOSPITAL | Age: 35
Setting detail: OBSERVATION
Discharge: HOME | End: 2020-08-28
Attending: OBSTETRICS & GYNECOLOGY | Admitting: OBSTETRICS & GYNECOLOGY

## 2020-08-28 ENCOUNTER — HOSPITAL ENCOUNTER (OUTPATIENT)
Facility: HOSPITAL | Age: 35
End: 2020-08-28
Attending: OBSTETRICS & GYNECOLOGY | Admitting: OBSTETRICS & GYNECOLOGY

## 2020-08-28 VITALS
WEIGHT: 161 LBS | HEIGHT: 67 IN | BODY MASS INDEX: 25.27 KG/M2 | SYSTOLIC BLOOD PRESSURE: 108 MMHG | RESPIRATION RATE: 18 BRPM | DIASTOLIC BLOOD PRESSURE: 65 MMHG | HEART RATE: 89 BPM | TEMPERATURE: 98.8 F

## 2020-08-28 PROBLEM — O23.43 UTI (URINARY TRACT INFECTION) IN PREGNANCY IN THIRD TRIMESTER: Status: ACTIVE | Noted: 2020-08-28

## 2020-08-28 LAB
BACTERIA UR QL AUTO: ABNORMAL /HPF
BILIRUB UR QL STRIP: NEGATIVE
CLARITY UR: CLEAR
COLOR UR: YELLOW
GLUCOSE UR STRIP-MCNC: NEGATIVE MG/DL
HGB UR QL STRIP.AUTO: NEGATIVE
HYALINE CASTS UR QL AUTO: ABNORMAL /LPF
KETONES UR QL STRIP: ABNORMAL
LEUKOCYTE ESTERASE UR QL STRIP.AUTO: ABNORMAL
NITRITE UR QL STRIP: NEGATIVE
PH UR STRIP.AUTO: 6.5 [PH] (ref 5–8)
POC AMNISURE: NEGATIVE
PROT UR QL STRIP: NEGATIVE
RBC # UR: ABNORMAL /HPF
REF LAB TEST METHOD: ABNORMAL
SP GR UR STRIP: 1.02 (ref 1–1.03)
SQUAMOUS #/AREA URNS HPF: ABNORMAL /HPF
UROBILINOGEN UR QL STRIP: ABNORMAL
WBC UR QL AUTO: ABNORMAL /HPF

## 2020-08-28 PROCEDURE — 81001 URINALYSIS AUTO W/SCOPE: CPT | Performed by: OBSTETRICS & GYNECOLOGY

## 2020-08-28 PROCEDURE — 25010000002 CEFTRIAXONE PER 250 MG: Performed by: OBSTETRICS & GYNECOLOGY

## 2020-08-28 PROCEDURE — 59025 FETAL NON-STRESS TEST: CPT | Performed by: OBSTETRICS & GYNECOLOGY

## 2020-08-28 PROCEDURE — G0378 HOSPITAL OBSERVATION PER HR: HCPCS

## 2020-08-28 PROCEDURE — 99219 PR INITIAL OBSERVATION CARE/DAY 50 MINUTES: CPT | Performed by: OBSTETRICS & GYNECOLOGY

## 2020-08-28 PROCEDURE — 96360 HYDRATION IV INFUSION INIT: CPT

## 2020-08-28 PROCEDURE — 59025 FETAL NON-STRESS TEST: CPT

## 2020-08-28 PROCEDURE — 84112 EVAL AMNIOTIC FLUID PROTEIN: CPT | Performed by: OBSTETRICS & GYNECOLOGY

## 2020-08-28 RX ORDER — SODIUM CHLORIDE 9 MG/ML
125 INJECTION, SOLUTION INTRAVENOUS CONTINUOUS
Status: DISCONTINUED | OUTPATIENT
Start: 2020-08-28 | End: 2020-08-29 | Stop reason: HOSPADM

## 2020-08-28 RX ORDER — FAMOTIDINE 10 MG
10 TABLET ORAL 2 TIMES DAILY
COMMUNITY
End: 2022-06-22

## 2020-08-28 RX ADMIN — SODIUM CHLORIDE 1 G: 900 INJECTION INTRAVENOUS at 22:24

## 2020-08-28 RX ADMIN — SODIUM CHLORIDE 125 ML/HR: 9 INJECTION, SOLUTION INTRAVENOUS at 22:20

## 2020-09-04 PROCEDURE — U0003 INFECTIOUS AGENT DETECTION BY NUCLEIC ACID (DNA OR RNA); SEVERE ACUTE RESPIRATORY SYNDROME CORONAVIRUS 2 (SARS-COV-2) (CORONAVIRUS DISEASE [COVID-19]), AMPLIFIED PROBE TECHNIQUE, MAKING USE OF HIGH THROUGHPUT TECHNOLOGIES AS DESCRIBED BY CMS-2020-01-R: HCPCS | Performed by: NURSE PRACTITIONER

## 2020-09-07 ENCOUNTER — TELEPHONE (OUTPATIENT)
Dept: URGENT CARE | Facility: CLINIC | Age: 35
End: 2020-09-07

## 2020-09-07 NOTE — TELEPHONE ENCOUNTER
----- Message from Raghav Almeida MD sent at 9/7/2020 12:54 PM EDT -----  COVID is not detected. Recommend current (10day/24hr) protocol.  Please notify the patient.-Raghav Almeida M.D.      ----- Message -----  From: Lab, Background User  Sent: 9/7/2020   8:08 AM EDT  To: [facility] Covid Results    Spoke with Pt informed lab result was not detected. Informed Pt the CDC still recommends 10 day quarantine from onset of symptoms. Pt verbalized understanding stated she is feeling better no further questions

## 2020-09-17 ENCOUNTER — ROUTINE PRENATAL (OUTPATIENT)
Dept: OBSTETRICS AND GYNECOLOGY | Facility: CLINIC | Age: 35
End: 2020-09-17

## 2020-09-17 VITALS — SYSTOLIC BLOOD PRESSURE: 98 MMHG | BODY MASS INDEX: 26.06 KG/M2 | DIASTOLIC BLOOD PRESSURE: 60 MMHG | WEIGHT: 166.4 LBS

## 2020-09-17 DIAGNOSIS — Z3A.31 31 WEEKS GESTATION OF PREGNANCY: Primary | ICD-10-CM

## 2020-09-17 PROBLEM — O09.529 ADVANCED MATERNAL AGE IN MULTIGRAVIDA: Status: ACTIVE | Noted: 2020-09-17

## 2020-09-17 LAB
EXPIRATION DATE: 0
GLUCOSE UR STRIP-MCNC: NEGATIVE MG/DL
Lab: 0
PROT UR STRIP-MCNC: NEGATIVE MG/DL

## 2020-09-17 PROCEDURE — 0502F SUBSEQUENT PRENATAL CARE: CPT | Performed by: OBSTETRICS & GYNECOLOGY

## 2020-09-17 RX ORDER — RANITIDINE HCL 75 MG
75 TABLET ORAL 2 TIMES DAILY
COMMUNITY
End: 2020-11-05

## 2020-09-17 NOTE — PROGRESS NOTES
OB FOLLOW UP  CC- Here for care of pregnancy        Lazara Sanchez is a 35 y.o.  31w0d patient being seen today for her obstetrical follow up visit. Patient reports Justus Dalton.     Her prenatal care is complicated by (and status) :    Patient Active Problem List   Diagnosis   • UTI (urinary tract infection) in pregnancy in third trimester   • Advanced maternal age in multigravida       The additional following portions of the patient's history were reviewed and updated as appropriate: allergies, current medications, past family history, past medical history, past social history, past surgical history and problem list.    ROS -   Patient Reports : Justus Dalton  Patient Denies: Loss of Fluid, Vaginal Spotting, Vision Changes and Headaches  Fetal Movement : normal    I have reviewed and agree with the HPI, ROS, and historical information as entered above. Lisa Belle MD    BP 98/60   Wt 75.5 kg (166 lb 6.4 oz)   LMP 2020   BMI 26.06 kg/m²     Ultrasound Today: no    EXAM:   Prenatal Vitals  BP: 98/60  Weight: 75.5 kg (166 lb 6.4 oz)    Prenatal Assessment  Movement: Present            Assessment and Plan    Problem List Items Addressed This Visit     None      Visit Diagnoses     31 weeks gestation of pregnancy    -  Primary    Relevant Orders    POC Glucose, Urine, Qualitative, Dipstick (Completed)    POC Protein, Urine, Qualitative, Dipstick (Completed)          1. Pregnancy at 31w0d  2. Fetal status reassuring.   3. Activity and Exercise discussed.  Return in about 2 weeks (around 10/1/2020) for F/U Prenatal.  4. Has FU with PDC next week    Lisa Belle MD  2020

## 2020-09-24 ENCOUNTER — OFFICE VISIT (OUTPATIENT)
Dept: OBSTETRICS AND GYNECOLOGY | Facility: HOSPITAL | Age: 35
End: 2020-09-24

## 2020-09-24 ENCOUNTER — HOSPITAL ENCOUNTER (OUTPATIENT)
Dept: WOMENS IMAGING | Facility: HOSPITAL | Age: 35
Discharge: HOME | End: 2020-09-24
Admitting: OBSTETRICS & GYNECOLOGY

## 2020-09-24 VITALS — SYSTOLIC BLOOD PRESSURE: 100 MMHG | WEIGHT: 169 LBS | BODY MASS INDEX: 26.47 KG/M2 | DIASTOLIC BLOOD PRESSURE: 60 MMHG

## 2020-09-24 DIAGNOSIS — Z34.90 PREGNANCY, UNSPECIFIED GESTATIONAL AGE: ICD-10-CM

## 2020-09-24 DIAGNOSIS — O09.529 ANTEPARTUM MULTIGRAVIDA OF ADVANCED MATERNAL AGE: ICD-10-CM

## 2020-09-24 DIAGNOSIS — O09.523 MULTIGRAVIDA OF ADVANCED MATERNAL AGE IN THIRD TRIMESTER: Primary | ICD-10-CM

## 2020-09-24 PROCEDURE — 76816 OB US FOLLOW-UP PER FETUS: CPT

## 2020-09-24 PROCEDURE — 76816 OB US FOLLOW-UP PER FETUS: CPT | Performed by: OBSTETRICS & GYNECOLOGY

## 2020-10-02 ENCOUNTER — ROUTINE PRENATAL (OUTPATIENT)
Dept: OBSTETRICS AND GYNECOLOGY | Facility: CLINIC | Age: 35
End: 2020-10-02

## 2020-10-02 VITALS — BODY MASS INDEX: 26.45 KG/M2 | DIASTOLIC BLOOD PRESSURE: 64 MMHG | SYSTOLIC BLOOD PRESSURE: 100 MMHG | WEIGHT: 168.9 LBS

## 2020-10-02 DIAGNOSIS — Z3A.33 33 WEEKS GESTATION OF PREGNANCY: Primary | ICD-10-CM

## 2020-10-02 LAB
GLUCOSE UR STRIP-MCNC: NEGATIVE MG/DL
PROT UR STRIP-MCNC: NEGATIVE MG/DL

## 2020-10-02 PROCEDURE — 0502F SUBSEQUENT PRENATAL CARE: CPT | Performed by: NURSE PRACTITIONER

## 2020-10-02 PROCEDURE — 90686 IIV4 VACC NO PRSV 0.5 ML IM: CPT | Performed by: NURSE PRACTITIONER

## 2020-10-02 PROCEDURE — 90471 IMMUNIZATION ADMIN: CPT | Performed by: NURSE PRACTITIONER

## 2020-10-02 NOTE — PROGRESS NOTES
OB FOLLOW UP  CC- Here for care of pregnancy        Lazara Sanchez is a 35 y.o.  33w1d patient being seen today for her obstetrical follow up visit. Patient reports occasional carmelita lloyd.     Her prenatal care is complicated by (and status) :    Patient Active Problem List   Diagnosis   • UTI (urinary tract infection) in pregnancy in third trimester   • Advanced maternal age in multigravida       Flu Status: Will give in office today    The additional following portions of the patient's history were reviewed and updated as appropriate: allergies, current medications, past family history, past medical history, past social history, past surgical history and problem list.    ROS -   Patient Reports : occasional carmelita lloyd  Patient Denies: Loss of Fluid, Vaginal Spotting, Vision Changes, Headaches and Cramping/Contractions   Fetal Movement : >10 movements  All other systems reviewed and are negative.     I have reviewed and agree with the HPI, ROS, and historical information as entered above. Yael Foley, APRN    /64   Wt 76.6 kg (168 lb 14.4 oz)   LMP 2020   BMI 26.45 kg/m²     Ultrasound Today: no    EXAM:   Vitals: See prenatal flowsheet   Abdomen: See prenatal flowsheet   Urine glucose/protein: See prenatal flowsheet   Pelvic: See prenatal flowsheet   HRT: See prenatal flowsheet   Presentation: See prenatal flowsheet   Movement: See prenatal flowsheet          Assessment and Plan    Problem List Items Addressed This Visit     None      Visit Diagnoses     33 weeks gestation of pregnancy    -  Primary    Relevant Orders    POC Urinalysis Dipstick (Completed)    Fluarix Quad >6 Months ([id number]) (Completed)          1. Pregnancy at 33w1d  2. Fetal status reassuring.   3. Flu vaccine given today  4. Had PDC scan on 20  EFW= 78%.  5.   Appointment in 2 weeks with Dr.Schell Yael Foley, APRN  10/02/2020

## 2020-10-16 ENCOUNTER — TELEPHONE (OUTPATIENT)
Dept: OBSTETRICS AND GYNECOLOGY | Facility: CLINIC | Age: 35
End: 2020-10-16

## 2020-10-16 NOTE — TELEPHONE ENCOUNTER
About 1 hour ago, ctx every 5-8 minutes. She was in [place] and got in her car and headed home and her ctx went to about 4 in an hour after hydrating a lot. Informed if they are slowing ok to monitor. Instructed to go home and rest, if >ctx  to every 5 minutes continuing for 1 hour come in to L&D, she VU

## 2020-10-20 ENCOUNTER — ROUTINE PRENATAL (OUTPATIENT)
Dept: OBSTETRICS AND GYNECOLOGY | Facility: CLINIC | Age: 35
End: 2020-10-20

## 2020-10-20 VITALS — DIASTOLIC BLOOD PRESSURE: 78 MMHG | SYSTOLIC BLOOD PRESSURE: 110 MMHG | BODY MASS INDEX: 27.25 KG/M2 | WEIGHT: 174 LBS

## 2020-10-20 DIAGNOSIS — Z3A.35 35 WEEKS GESTATION OF PREGNANCY: Primary | ICD-10-CM

## 2020-10-20 PROCEDURE — 0502F SUBSEQUENT PRENATAL CARE: CPT | Performed by: OBSTETRICS & GYNECOLOGY

## 2020-10-20 NOTE — PROGRESS NOTES
OB FOLLOW UP    Lazara Sanchez is a 35 y.o.  35w5d patient being seen today for her obstetrical follow up visit. Patient was having regular ctx last Friday that resolved w/ rest. Since then she has had a couple per day. Denies vag bldg, lof, dysuria, n/v. Also c/o occ h/a.      Her prenatal care is complicated by (and status) : AMA    EFW @ PDC 32wks 78th%    Her kick counts are adequate    The additional following portions of the patient's history were reviewed and updated as appropriate: allergies, current medications, past family history, past medical history, past social history, past surgical history and problem list.      ROS -      Vaginal bleeding none    /78   Wt 78.9 kg (174 lb)   LMP 2020   BMI 27.25 kg/m²     FHT:  present   Pelvic Exam: Performed: No     Assessment    1. Pregnancy at 35w5d  2. Fetal status reassuring  3. TDAP was already given    Problem List Items Addressed This Visit     None          Plan    1. GBS next week.   2. Reviewed kick counts.  3.   4. Follow up: 2 weeks  5. Call for any problems in interim.    Lisa Belle MD  10/20/2020

## 2020-10-27 ENCOUNTER — ROUTINE PRENATAL (OUTPATIENT)
Dept: OBSTETRICS AND GYNECOLOGY | Facility: CLINIC | Age: 35
End: 2020-10-27

## 2020-10-27 VITALS — DIASTOLIC BLOOD PRESSURE: 76 MMHG | SYSTOLIC BLOOD PRESSURE: 112 MMHG | BODY MASS INDEX: 27.39 KG/M2 | WEIGHT: 174.9 LBS

## 2020-10-27 DIAGNOSIS — Z3A.36 36 WEEKS GESTATION OF PREGNANCY: Primary | ICD-10-CM

## 2020-10-27 LAB
GLUCOSE UR STRIP-MCNC: NEGATIVE MG/DL
PROT UR STRIP-MCNC: NEGATIVE MG/DL

## 2020-10-27 PROCEDURE — 59425 ANTEPARTUM CARE ONLY: CPT | Performed by: OBSTETRICS & GYNECOLOGY

## 2020-10-27 PROCEDURE — 87081 CULTURE SCREEN ONLY: CPT | Performed by: NURSE PRACTITIONER

## 2020-10-27 NOTE — PROGRESS NOTES
OB FOLLOW UP    Lazara Sanchez is a 35 y.o.  36w5d patient being seen today for her obstetrical follow up visit. Patient reports fatigue, headache, heartburn and occasional contractions.     Her prenatal care is complicated by (and status) :    Patient Active Problem List   Diagnosis   • UTI (urinary tract infection) in pregnancy in third trimester   • Advanced maternal age in multigravida       ROS -   Patient Reports : Headaches and Cramping/Contractions   Patient Denies: Loss of Fluid, Vaginal Spotting and Vision Changes  Fetal Movement : normal    /76   Wt 79.3 kg (174 lb 14.4 oz)   LMP 2020   BMI 27.39 kg/m²     Ultrasound Today: no    EXAM:  Vitals: See prenatal flowsheet   Abdomen: See prenatal flowsheet   Urine glucose/protein: See prenatal flowsheet   Pelvic: See prenatal flowsheet     GBS Status: Done Today  Her Delivery Plan is: Does not desire IOL at this time    Assessment    1. Pregnancy at 36w5d  2. Fetal status reassuring     Problem List Items Addressed This Visit     None      Visit Diagnoses     36 weeks gestation of pregnancy    -  Primary    Relevant Orders    Group B Streptococcus Culture - Swab, Vaginal/Rectum    POC Urinalysis Dipstick (Completed)          Plan    1. GBS today  2. Labor precautions reviewed.  3. F/U in 1 week.     Kathy Bee, APRN  10/27/2020

## 2020-10-30 LAB — BACTERIA SPEC AEROBE CULT: NORMAL

## 2020-11-05 ENCOUNTER — ROUTINE PRENATAL (OUTPATIENT)
Dept: OBSTETRICS AND GYNECOLOGY | Facility: CLINIC | Age: 35
End: 2020-11-05

## 2020-11-05 VITALS — WEIGHT: 176.4 LBS | DIASTOLIC BLOOD PRESSURE: 68 MMHG | SYSTOLIC BLOOD PRESSURE: 114 MMHG | BODY MASS INDEX: 27.63 KG/M2

## 2020-11-05 DIAGNOSIS — Z3A.38 38 WEEKS GESTATION OF PREGNANCY: Primary | ICD-10-CM

## 2020-11-05 LAB
GLUCOSE UR STRIP-MCNC: NEGATIVE MG/DL
PROT UR STRIP-MCNC: NEGATIVE MG/DL

## 2020-11-05 PROCEDURE — 99213 OFFICE O/P EST LOW 20 MIN: CPT | Performed by: NURSE PRACTITIONER

## 2020-11-05 NOTE — PROGRESS NOTES
OB FOLLOW UP    Lazara Sanchez is a 35 y.o.  38w0d patient being seen today for her obstetrical follow up visit. Patient reports fatigue, heartburn and occasional contractions.     Her prenatal care is complicated by (and status) :    Patient Active Problem List   Diagnosis   • UTI (urinary tract infection) in pregnancy in third trimester   • Advanced maternal age in multigravida       ROS -   Patient Reports : Cramping/Contractions   Patient Denies:  Loss of Fluid, Vaginal Spotting, Vision Changes and Headaches  Fetal Movement : normal    I have reviewed and agree with the HPI, ROS, and historical information as entered above. Lynette James MA    LMP 2020     Ultrasound Today: no    EXAM:  Vitals: See prenatal flowsheet   Abdomen: See prenatal flowsheet   Urine glucose/protein: See prenatal flowsheet   HRT: See prenatal flowsheet   Presentation: See prenatal flowsheet       Pelvic: Yes, see flowsheet     Assessment    1. Pregnancy at 38w0d  2. Fetal status reassuring     Problem List Items Addressed This Visit     None          Plan    1. Labor precautions and kick counts reviewed. Declines IOL for now, may desire membranes stripped at next visit.   2. Follow up: 1 week(s) with FELIX James MA  2020

## 2020-11-12 ENCOUNTER — ROUTINE PRENATAL (OUTPATIENT)
Dept: OBSTETRICS AND GYNECOLOGY | Facility: CLINIC | Age: 35
End: 2020-11-12

## 2020-11-12 VITALS — SYSTOLIC BLOOD PRESSURE: 98 MMHG | WEIGHT: 177 LBS | DIASTOLIC BLOOD PRESSURE: 65 MMHG | BODY MASS INDEX: 27.72 KG/M2

## 2020-11-12 DIAGNOSIS — Z3A.39 39 WEEKS GESTATION OF PREGNANCY: Primary | ICD-10-CM

## 2020-11-12 LAB
GLUCOSE UR STRIP-MCNC: NEGATIVE MG/DL
PROT UR STRIP-MCNC: NEGATIVE MG/DL

## 2020-11-12 PROCEDURE — 99213 OFFICE O/P EST LOW 20 MIN: CPT | Performed by: OBSTETRICS & GYNECOLOGY

## 2020-11-12 NOTE — PROGRESS NOTES
OB FOLLOW UP  CC- Here for care of pregnancy        Lazara Sanchez is a 35 y.o.  39w0d patient being seen today for her obstetrical follow up visit. Patient reports occasional contractions.     Her prenatal care is complicated by (and status) :    Patient Active Problem List   Diagnosis   • UTI (urinary tract infection) in pregnancy in third trimester   • Advanced maternal age in multigravida         Flu Status: Already given in current flu season  GBS Status: Was already done and it was negative.   Her Delivery Plan is: would like to discuss today.  Ultrasound Today: No.    ROS -   Patient Reports : carmelita lloyd  Patient Denies: Loss of Fluid, Vaginal Spotting, Vision Changes, Headaches, Nausea , Vomiting , Contractions and Epigastric pain  Fetal Movement : normal  All other systems reviewed and are negative.       The additional following portions of the patient's history were reviewed and updated as appropriate: allergies, current medications, past family history, past medical history, past social history, past surgical history and problem list.    I have reviewed and agree with the HPI, ROS, and historical information as entered above. Lisa Belle MD        BP 98/65   Wt 80.3 kg (177 lb)   LMP 2020   BMI 27.72 kg/m²     EXAM:     FHT: + BPM     Pelvic Exam: yes    Urine glucose/protein: See prenatal flowsheet       Assessment and Plan    Problem List Items Addressed This Visit     None      Visit Diagnoses     39 weeks gestation of pregnancy    -  Primary    Relevant Orders    POC Urinalysis Dipstick (Completed)          1. Pregnancy at 39w0d  2. Fetal status reassuring.   3. Activity and Exercise discussed.  Return in about 1 day (around 2020) for F/U Prenatal with Yoshi.    Lisa Belle MD  2020

## 2020-11-13 ENCOUNTER — ROUTINE PRENATAL (OUTPATIENT)
Dept: OBSTETRICS AND GYNECOLOGY | Facility: CLINIC | Age: 35
End: 2020-11-13

## 2020-11-13 VITALS — DIASTOLIC BLOOD PRESSURE: 60 MMHG | SYSTOLIC BLOOD PRESSURE: 110 MMHG | BODY MASS INDEX: 27.88 KG/M2 | WEIGHT: 178 LBS

## 2020-11-13 DIAGNOSIS — Z3A.39 39 WEEKS GESTATION OF PREGNANCY: Primary | ICD-10-CM

## 2020-11-13 LAB
GLUCOSE UR STRIP-MCNC: NEGATIVE MG/DL
PROT UR STRIP-MCNC: NEGATIVE MG/DL

## 2020-11-13 PROCEDURE — 99213 OFFICE O/P EST LOW 20 MIN: CPT | Performed by: OBSTETRICS & GYNECOLOGY

## 2020-11-13 NOTE — PROGRESS NOTES
OB FOLLOW UP  CC- Here for care of pregnancy        Lazara Sanchez is a 35 y.o.  39w1d patient being seen today for her obstetrical follow up visit. Patient reports heartburn and BH.     Her prenatal care is complicaated by (and status) :    Patient Active Problem List   Diagnosis   • UTI (urinary tract infection) in pregnancy in third trimester   • Advanced maternal age in multigravida         Flu Status: Already given in current flu season  GBS Status: Was already done and it was negative.   Her Delivery Plan is: Undecided  Ultrasound Today: No.    ROS -   Patient Reports : BH ctx and heartburn  Patient Denies: Loss of Fluid, Vaginal Spotting, Vision Changes, Headaches, Nausea  and Vomiting   Fetal Movement : normal  All other systems reviewed and are negative.       The additional following portions of the patient's history were reviewed and updated as appropriate: allergies, current medications, past family history, past medical history, past social history, past surgical history and problem list.    I have reviewed and agree with the HPI, ROS, and historical information as entered above. Lisa Belle MD        /60   Wt 80.7 kg (178 lb)   LMP 2020   BMI 27.88 kg/m²     EXAM:     FHT: + BPM     Pelvic Exam: yes    Urine glucose/protein: See prenatal flowsheet       Assessment and Plan    Problem List Items Addressed This Visit     None      Visit Diagnoses     39 weeks gestation of pregnancy    -  Primary    Relevant Orders    POC Urinalysis Dipstick (Completed)          1. Pregnancy at 39w1d  2. Fetal status reassuring.   3. Activity and Exercise discussed.  No follow-ups on file.    Lisa Belle MD  2020

## 2020-11-15 ENCOUNTER — ANESTHESIA EVENT (OUTPATIENT)
Dept: LABOR AND DELIVERY | Facility: HOSPITAL | Age: 35
End: 2020-11-15

## 2020-11-15 ENCOUNTER — HOSPITAL ENCOUNTER (INPATIENT)
Facility: HOSPITAL | Age: 35
LOS: 2 days | Discharge: HOME | End: 2020-11-17
Attending: OBSTETRICS & GYNECOLOGY | Admitting: OBSTETRICS & GYNECOLOGY

## 2020-11-15 ENCOUNTER — ANESTHESIA (OUTPATIENT)
Dept: LABOR AND DELIVERY | Facility: HOSPITAL | Age: 35
End: 2020-11-15

## 2020-11-15 PROBLEM — Z37.9 NORMAL LABOR: Status: ACTIVE | Noted: 2020-11-15

## 2020-11-15 LAB
ABO GROUP BLD: NORMAL
ALP SERPL-CCNC: 131 U/L (ref 39–117)
ALT SERPL W P-5'-P-CCNC: 8 U/L (ref 1–33)
AST SERPL-CCNC: 14 U/L (ref 1–32)
BILIRUB SERPL-MCNC: 0.3 MG/DL (ref 0–1.2)
BLD GP AB SCN SERPL QL: NEGATIVE
CREAT SERPL-MCNC: 0.57 MG/DL (ref 0.57–1)
DEPRECATED RDW RBC AUTO: 43.6 FL (ref 37–54)
ERYTHROCYTE [DISTWIDTH] IN BLOOD BY AUTOMATED COUNT: 13.2 % (ref 12.3–15.4)
HCT VFR BLD AUTO: 37.5 % (ref 34–46.6)
HGB BLD-MCNC: 12.3 G/DL (ref 12–15.9)
LDH SERPL-CCNC: 159 U/L (ref 135–214)
MCH RBC QN AUTO: 29.7 PG (ref 26.6–33)
MCHC RBC AUTO-ENTMCNC: 32.8 G/DL (ref 31.5–35.7)
MCV RBC AUTO: 90.6 FL (ref 79–97)
PLATELET # BLD AUTO: 227 10*3/MM3 (ref 140–450)
PMV BLD AUTO: 10.6 FL (ref 6–12)
RBC # BLD AUTO: 4.14 10*6/MM3 (ref 3.77–5.28)
RH BLD: POSITIVE
SARS-COV-2 RDRP RESP QL NAA+PROBE: NOT DETECTED
T&S EXPIRATION DATE: NORMAL
URATE SERPL-MCNC: 2.7 MG/DL (ref 2.4–5.7)
WBC # BLD AUTO: 12.52 10*3/MM3 (ref 3.4–10.8)

## 2020-11-15 PROCEDURE — 25010000002 ONDANSETRON PER 1 MG: Performed by: ANESTHESIOLOGY

## 2020-11-15 PROCEDURE — 0HQ9XZZ REPAIR PERINEUM SKIN, EXTERNAL APPROACH: ICD-10-PCS | Performed by: OBSTETRICS & GYNECOLOGY

## 2020-11-15 PROCEDURE — 86901 BLOOD TYPING SEROLOGIC RH(D): CPT | Performed by: OBSTETRICS & GYNECOLOGY

## 2020-11-15 PROCEDURE — 25010000002 ROPIVACAINE PER 1 MG: Performed by: ANESTHESIOLOGY

## 2020-11-15 PROCEDURE — 82565 ASSAY OF CREATININE: CPT | Performed by: OBSTETRICS & GYNECOLOGY

## 2020-11-15 PROCEDURE — 86900 BLOOD TYPING SEROLOGIC ABO: CPT | Performed by: OBSTETRICS & GYNECOLOGY

## 2020-11-15 PROCEDURE — 25010000002 FENTANYL CITRATE (PF) 100 MCG/2ML SOLUTION: Performed by: ANESTHESIOLOGY

## 2020-11-15 PROCEDURE — C1755 CATHETER, INTRASPINAL: HCPCS

## 2020-11-15 PROCEDURE — 59410 OBSTETRICAL CARE: CPT | Performed by: OBSTETRICS & GYNECOLOGY

## 2020-11-15 PROCEDURE — S0260 H&P FOR SURGERY: HCPCS | Performed by: OBSTETRICS & GYNECOLOGY

## 2020-11-15 PROCEDURE — 86850 RBC ANTIBODY SCREEN: CPT | Performed by: OBSTETRICS & GYNECOLOGY

## 2020-11-15 PROCEDURE — 84550 ASSAY OF BLOOD/URIC ACID: CPT | Performed by: OBSTETRICS & GYNECOLOGY

## 2020-11-15 PROCEDURE — 84450 TRANSFERASE (AST) (SGOT): CPT | Performed by: OBSTETRICS & GYNECOLOGY

## 2020-11-15 PROCEDURE — 82247 BILIRUBIN TOTAL: CPT | Performed by: OBSTETRICS & GYNECOLOGY

## 2020-11-15 PROCEDURE — 59025 FETAL NON-STRESS TEST: CPT

## 2020-11-15 PROCEDURE — 51702 INSERT TEMP BLADDER CATH: CPT

## 2020-11-15 PROCEDURE — C1755 CATHETER, INTRASPINAL: HCPCS | Performed by: ANESTHESIOLOGY

## 2020-11-15 PROCEDURE — 83615 LACTATE (LD) (LDH) ENZYME: CPT | Performed by: OBSTETRICS & GYNECOLOGY

## 2020-11-15 PROCEDURE — 84460 ALANINE AMINO (ALT) (SGPT): CPT | Performed by: OBSTETRICS & GYNECOLOGY

## 2020-11-15 PROCEDURE — 87635 SARS-COV-2 COVID-19 AMP PRB: CPT | Performed by: OBSTETRICS & GYNECOLOGY

## 2020-11-15 PROCEDURE — 85027 COMPLETE CBC AUTOMATED: CPT | Performed by: OBSTETRICS & GYNECOLOGY

## 2020-11-15 PROCEDURE — 84075 ASSAY ALKALINE PHOSPHATASE: CPT | Performed by: OBSTETRICS & GYNECOLOGY

## 2020-11-15 RX ORDER — LANOLIN
CREAM (ML) TOPICAL
Status: DISCONTINUED | OUTPATIENT
Start: 2020-11-15 | End: 2020-11-17 | Stop reason: HOSPADM

## 2020-11-15 RX ORDER — HYDROCORTISONE 25 MG/G
1 CREAM TOPICAL AS NEEDED
Status: DISCONTINUED | OUTPATIENT
Start: 2020-11-15 | End: 2020-11-17 | Stop reason: HOSPADM

## 2020-11-15 RX ORDER — OXYCODONE HYDROCHLORIDE AND ACETAMINOPHEN 5; 325 MG/1; MG/1
1 TABLET ORAL EVERY 4 HOURS PRN
Status: DISCONTINUED | OUTPATIENT
Start: 2020-11-15 | End: 2020-11-17 | Stop reason: HOSPADM

## 2020-11-15 RX ORDER — ONDANSETRON 2 MG/ML
4 INJECTION INTRAMUSCULAR; INTRAVENOUS EVERY 6 HOURS PRN
Status: DISCONTINUED | OUTPATIENT
Start: 2020-11-15 | End: 2020-11-15 | Stop reason: HOSPADM

## 2020-11-15 RX ORDER — SODIUM CHLORIDE 0.9 % (FLUSH) 0.9 %
1-10 SYRINGE (ML) INJECTION AS NEEDED
Status: DISCONTINUED | OUTPATIENT
Start: 2020-11-15 | End: 2020-11-16

## 2020-11-15 RX ORDER — LIDOCAINE HYDROCHLORIDE 10 MG/ML
5 INJECTION, SOLUTION EPIDURAL; INFILTRATION; INTRACAUDAL; PERINEURAL AS NEEDED
Status: DISCONTINUED | OUTPATIENT
Start: 2020-11-15 | End: 2020-11-15 | Stop reason: HOSPADM

## 2020-11-15 RX ORDER — BISACODYL 10 MG
10 SUPPOSITORY, RECTAL RECTAL DAILY PRN
Status: DISCONTINUED | OUTPATIENT
Start: 2020-11-16 | End: 2020-11-17 | Stop reason: HOSPADM

## 2020-11-15 RX ORDER — SODIUM CHLORIDE 0.9 % (FLUSH) 0.9 %
3 SYRINGE (ML) INJECTION EVERY 12 HOURS SCHEDULED
Status: DISCONTINUED | OUTPATIENT
Start: 2020-11-15 | End: 2020-11-15 | Stop reason: HOSPADM

## 2020-11-15 RX ORDER — HYDROCODONE BITARTRATE AND ACETAMINOPHEN 5; 325 MG/1; MG/1
1 TABLET ORAL EVERY 4 HOURS PRN
Status: DISCONTINUED | OUTPATIENT
Start: 2020-11-15 | End: 2020-11-17 | Stop reason: HOSPADM

## 2020-11-15 RX ORDER — ONDANSETRON 2 MG/ML
4 INJECTION INTRAMUSCULAR; INTRAVENOUS EVERY 6 HOURS PRN
Status: DISCONTINUED | OUTPATIENT
Start: 2020-11-15 | End: 2020-11-16

## 2020-11-15 RX ORDER — HYDROCODONE BITARTRATE AND ACETAMINOPHEN 5; 325 MG/1; MG/1
1 TABLET ORAL EVERY 4 HOURS PRN
Status: DISCONTINUED | OUTPATIENT
Start: 2020-11-15 | End: 2020-11-15 | Stop reason: HOSPADM

## 2020-11-15 RX ORDER — EPHEDRINE SULFATE 5 MG/ML
10 INJECTION INTRAVENOUS
Status: DISCONTINUED | OUTPATIENT
Start: 2020-11-15 | End: 2020-11-15 | Stop reason: HOSPADM

## 2020-11-15 RX ORDER — LIDOCAINE HYDROCHLORIDE AND EPINEPHRINE 15; 5 MG/ML; UG/ML
INJECTION, SOLUTION EPIDURAL AS NEEDED
Status: DISCONTINUED | OUTPATIENT
Start: 2020-11-15 | End: 2020-11-15 | Stop reason: SURG

## 2020-11-15 RX ORDER — METHYLERGONOVINE MALEATE 0.2 MG/ML
200 INJECTION INTRAVENOUS ONCE AS NEEDED
Status: DISCONTINUED | OUTPATIENT
Start: 2020-11-15 | End: 2020-11-15 | Stop reason: HOSPADM

## 2020-11-15 RX ORDER — METOCLOPRAMIDE HYDROCHLORIDE 5 MG/ML
10 INJECTION INTRAMUSCULAR; INTRAVENOUS ONCE AS NEEDED
Status: DISCONTINUED | OUTPATIENT
Start: 2020-11-15 | End: 2020-11-15 | Stop reason: HOSPADM

## 2020-11-15 RX ORDER — ROPIVACAINE HYDROCHLORIDE 2 MG/ML
15 INJECTION, SOLUTION EPIDURAL; INFILTRATION; PERINEURAL CONTINUOUS
Status: DISCONTINUED | OUTPATIENT
Start: 2020-11-15 | End: 2020-11-15

## 2020-11-15 RX ORDER — CARBOPROST TROMETHAMINE 250 UG/ML
250 INJECTION, SOLUTION INTRAMUSCULAR AS NEEDED
Status: DISCONTINUED | OUTPATIENT
Start: 2020-11-15 | End: 2020-11-15 | Stop reason: HOSPADM

## 2020-11-15 RX ORDER — ONDANSETRON 2 MG/ML
4 INJECTION INTRAMUSCULAR; INTRAVENOUS ONCE AS NEEDED
Status: COMPLETED | OUTPATIENT
Start: 2020-11-15 | End: 2020-11-15

## 2020-11-15 RX ORDER — FENTANYL CITRATE 50 UG/ML
INJECTION, SOLUTION INTRAMUSCULAR; INTRAVENOUS AS NEEDED
Status: DISCONTINUED | OUTPATIENT
Start: 2020-11-15 | End: 2020-11-15 | Stop reason: SURG

## 2020-11-15 RX ORDER — DOCUSATE SODIUM 100 MG/1
100 CAPSULE, LIQUID FILLED ORAL 2 TIMES DAILY
Status: DISCONTINUED | OUTPATIENT
Start: 2020-11-15 | End: 2020-11-17 | Stop reason: HOSPADM

## 2020-11-15 RX ORDER — MAGNESIUM CARB/ALUMINUM HYDROX 105-160MG
30 TABLET,CHEWABLE ORAL ONCE
Status: DISCONTINUED | OUTPATIENT
Start: 2020-11-15 | End: 2020-11-15 | Stop reason: HOSPADM

## 2020-11-15 RX ORDER — SODIUM CHLORIDE, SODIUM LACTATE, POTASSIUM CHLORIDE, CALCIUM CHLORIDE 600; 310; 30; 20 MG/100ML; MG/100ML; MG/100ML; MG/100ML
125 INJECTION, SOLUTION INTRAVENOUS CONTINUOUS
Status: DISCONTINUED | OUTPATIENT
Start: 2020-11-15 | End: 2020-11-15

## 2020-11-15 RX ORDER — BUTORPHANOL TARTRATE 1 MG/ML
1 INJECTION, SOLUTION INTRAMUSCULAR; INTRAVENOUS
Status: DISCONTINUED | OUTPATIENT
Start: 2020-11-15 | End: 2020-11-15 | Stop reason: HOSPADM

## 2020-11-15 RX ORDER — ONDANSETRON 4 MG/1
4 TABLET, FILM COATED ORAL EVERY 6 HOURS PRN
Status: DISCONTINUED | OUTPATIENT
Start: 2020-11-15 | End: 2020-11-15 | Stop reason: HOSPADM

## 2020-11-15 RX ORDER — OXYTOCIN-SODIUM CHLORIDE 0.9% IV SOLN 30 UNIT/500ML 30-0.9/5 UT/ML-%
2-20 SOLUTION INTRAVENOUS
Status: DISCONTINUED | OUTPATIENT
Start: 2020-11-15 | End: 2020-11-15 | Stop reason: HOSPADM

## 2020-11-15 RX ORDER — MISOPROSTOL 200 UG/1
800 TABLET ORAL AS NEEDED
Status: DISCONTINUED | OUTPATIENT
Start: 2020-11-15 | End: 2020-11-15 | Stop reason: HOSPADM

## 2020-11-15 RX ORDER — DIPHENHYDRAMINE HYDROCHLORIDE 50 MG/ML
12.5 INJECTION INTRAMUSCULAR; INTRAVENOUS EVERY 8 HOURS PRN
Status: DISCONTINUED | OUTPATIENT
Start: 2020-11-15 | End: 2020-11-15 | Stop reason: HOSPADM

## 2020-11-15 RX ORDER — ONDANSETRON 4 MG/1
4 TABLET, FILM COATED ORAL EVERY 6 HOURS PRN
Status: DISCONTINUED | OUTPATIENT
Start: 2020-11-15 | End: 2020-11-17 | Stop reason: HOSPADM

## 2020-11-15 RX ORDER — TRISODIUM CITRATE DIHYDRATE AND CITRIC ACID MONOHYDRATE 500; 334 MG/5ML; MG/5ML
30 SOLUTION ORAL ONCE
Status: DISCONTINUED | OUTPATIENT
Start: 2020-11-15 | End: 2020-11-15 | Stop reason: HOSPADM

## 2020-11-15 RX ORDER — OXYTOCIN-SODIUM CHLORIDE 0.9% IV SOLN 30 UNIT/500ML 30-0.9/5 UT/ML-%
85 SOLUTION INTRAVENOUS ONCE
Status: COMPLETED | OUTPATIENT
Start: 2020-11-15 | End: 2020-11-15

## 2020-11-15 RX ORDER — FAMOTIDINE 10 MG/ML
20 INJECTION, SOLUTION INTRAVENOUS ONCE AS NEEDED
Status: COMPLETED | OUTPATIENT
Start: 2020-11-15 | End: 2020-11-15

## 2020-11-15 RX ORDER — SODIUM CHLORIDE, SODIUM LACTATE, POTASSIUM CHLORIDE, CALCIUM CHLORIDE 600; 310; 30; 20 MG/100ML; MG/100ML; MG/100ML; MG/100ML
125 INJECTION, SOLUTION INTRAVENOUS CONTINUOUS
Status: DISCONTINUED | OUTPATIENT
Start: 2020-11-15 | End: 2020-11-16

## 2020-11-15 RX ORDER — IBUPROFEN 600 MG/1
600 TABLET ORAL EVERY 6 HOURS PRN
Status: DISCONTINUED | OUTPATIENT
Start: 2020-11-15 | End: 2020-11-15 | Stop reason: HOSPADM

## 2020-11-15 RX ORDER — SODIUM CHLORIDE 0.9 % (FLUSH) 0.9 %
1-10 SYRINGE (ML) INJECTION AS NEEDED
Status: DISCONTINUED | OUTPATIENT
Start: 2020-11-15 | End: 2020-11-15 | Stop reason: HOSPADM

## 2020-11-15 RX ORDER — PROMETHAZINE HYDROCHLORIDE 12.5 MG/1
12.5 TABLET ORAL EVERY 4 HOURS PRN
Status: DISCONTINUED | OUTPATIENT
Start: 2020-11-15 | End: 2020-11-17 | Stop reason: HOSPADM

## 2020-11-15 RX ORDER — OXYTOCIN-SODIUM CHLORIDE 0.9% IV SOLN 30 UNIT/500ML 30-0.9/5 UT/ML-%
650 SOLUTION INTRAVENOUS ONCE
Status: COMPLETED | OUTPATIENT
Start: 2020-11-15 | End: 2020-11-15

## 2020-11-15 RX ORDER — IBUPROFEN 600 MG/1
600 TABLET ORAL EVERY 6 HOURS PRN
Status: DISCONTINUED | OUTPATIENT
Start: 2020-11-15 | End: 2020-11-17 | Stop reason: HOSPADM

## 2020-11-15 RX ADMIN — FENTANYL CITRATE 100 MCG: 50 INJECTION, SOLUTION INTRAMUSCULAR; INTRAVENOUS at 08:54

## 2020-11-15 RX ADMIN — LIDOCAINE HYDROCHLORIDE AND EPINEPHRINE 3 ML: 15; 5 INJECTION, SOLUTION EPIDURAL at 08:48

## 2020-11-15 RX ADMIN — ONDANSETRON 4 MG: 2 INJECTION INTRAMUSCULAR; INTRAVENOUS at 10:08

## 2020-11-15 RX ADMIN — SODIUM CHLORIDE, POTASSIUM CHLORIDE, SODIUM LACTATE AND CALCIUM CHLORIDE 1000 ML: 600; 310; 30; 20 INJECTION, SOLUTION INTRAVENOUS at 08:15

## 2020-11-15 RX ADMIN — DOCUSATE SODIUM 100 MG: 100 CAPSULE ORAL at 20:13

## 2020-11-15 RX ADMIN — SODIUM CHLORIDE, POTASSIUM CHLORIDE, SODIUM LACTATE AND CALCIUM CHLORIDE 125 ML/HR: 600; 310; 30; 20 INJECTION, SOLUTION INTRAVENOUS at 10:39

## 2020-11-15 RX ADMIN — OXYTOCIN 650 ML/HR: 10 INJECTION INTRAVENOUS at 13:52

## 2020-11-15 RX ADMIN — FAMOTIDINE 20 MG: 10 INJECTION INTRAVENOUS at 10:08

## 2020-11-15 RX ADMIN — EPHEDRINE SULFATE 10 MG: 5 INJECTION INTRAVENOUS at 09:59

## 2020-11-15 RX ADMIN — EPHEDRINE SULFATE 10 MG: 5 INJECTION INTRAVENOUS at 10:10

## 2020-11-15 RX ADMIN — OXYTOCIN 85 ML/HR: 10 INJECTION INTRAVENOUS at 14:55

## 2020-11-15 RX ADMIN — OXYTOCIN 2 MILLI-UNITS/MIN: 10 INJECTION INTRAVENOUS at 12:30

## 2020-11-15 RX ADMIN — SODIUM CHLORIDE, POTASSIUM CHLORIDE, SODIUM LACTATE AND CALCIUM CHLORIDE 125 ML/HR: 600; 310; 30; 20 INJECTION, SOLUTION INTRAVENOUS at 09:45

## 2020-11-15 RX ADMIN — Medication: at 17:48

## 2020-11-15 RX ADMIN — LIDOCAINE HYDROCHLORIDE AND EPINEPHRINE 2 ML: 15; 5 INJECTION, SOLUTION EPIDURAL at 08:50

## 2020-11-15 RX ADMIN — ROPIVACAINE HYDROCHLORIDE 15 ML/HR: 2 INJECTION, SOLUTION EPIDURAL; INFILTRATION at 08:56

## 2020-11-15 RX ADMIN — ROPIVACAINE HYDROCHLORIDE 13 ML: 5 INJECTION, SOLUTION EPIDURAL; INFILTRATION; PERINEURAL at 08:51

## 2020-11-15 RX ADMIN — IBUPROFEN 600 MG: 600 TABLET, FILM COATED ORAL at 20:13

## 2020-11-15 RX ADMIN — WITCH HAZEL 1 PAD: 500 SOLUTION RECTAL; TOPICAL at 17:48

## 2020-11-15 NOTE — PLAN OF CARE
Problem: Adult Inpatient Plan of Care  Goal: Plan of Care Review  11/15/2020 1423 by Lakisha Bruce RN  Outcome: Adequate for Care Transition     Problem: Adult Inpatient Plan of Care  Goal: Patient-Specific Goal (Individualized)  11/15/2020 1423 by Lakisha Bruce RN  Outcome: Adequate for Care Transition  11/15/2020 1024 by Lakisha Bruce RN  Outcome: Ongoing, Progressing     Problem: Adult Inpatient Plan of Care  Goal: Absence of Hospital-Acquired Illness or Injury  11/15/2020 1423 by Lakisha Bruce RN  Outcome: Adequate for Care Transition  11/15/2020 1024 by Lakisha Bruce RN  Outcome: Ongoing, Progressing     Problem: Adult Inpatient Plan of Care  Goal: Optimal Comfort and Wellbeing  11/15/2020 1423 by Lakisha Bruce RN  Outcome: Adequate for Care Transition  11/15/2020 1024 by Lakisha Bruce RN  Outcome: Ongoing, Progressing  Intervention: Provide Person-Centered Care  Description: Use a family-focused approach to care.  Develop trust and rapport by proactively providing information, encouraging questions, addressing concerns and offering reassurance.  Acknowledge emotional response to hospitalization.  Recognize and utilize personal coping strategies.  Honor spiritual and cultural preferences.  Recent Flowsheet Documentation  Taken 11/15/2020 0800 by Lakisha Bruce RN  Trust Relationship/Rapport:   care explained   choices provided   emotional support provided   questions answered   questions encouraged   reassurance provided   thoughts/feelings acknowledged     Problem: Adult Inpatient Plan of Care  Goal: Readiness for Transition of Care  11/15/2020 1423 by Lakisha Bruce RN  Outcome: Adequate for Care Transition  11/15/2020 1024 by Lakisha Bruce RN  Outcome: Ongoing, Progressing     Problem: Bleeding (Labor)  Goal: Hemostasis  11/15/2020 1423 by Lakisha Bruce RN  Outcome: Adequate for Care Transition  11/15/2020 1024 by Lakisha Bruce RN  Outcome: Ongoing, Progressing      Problem: Change in Fetal Wellbeing (Labor)  Goal: Stable Fetal Wellbeing  11/15/2020 1423 by Lakisha Bruce RN  Outcome: Adequate for Care Transition  11/15/2020 1024 by Lakisha Bruce RN  Outcome: Ongoing, Progressing     Problem: Delayed Labor Progression (Labor)  Goal: Effective Progression to Delivery  11/15/2020 1423 by Lakisha Bruce RN  Outcome: Adequate for Care Transition  11/15/2020 1024 by Lakisha Bruce RN  Outcome: Ongoing, Progressing     Problem: Infection (Labor)  Goal: Absence of Infection Signs and Symptoms  11/15/2020 1423 by Lakisha Bruce RN  Outcome: Adequate for Care Transition  11/15/2020 1024 by Lakisha Bruce RN  Outcome: Ongoing, Progressing     Problem: Labor Pain (Labor)  Goal: Acceptable Pain Control  11/15/2020 1423 by Lakisha Bruce RN  Outcome: Adequate for Care Transition  11/15/2020 1024 by Lakisha Bruce RN  Outcome: Ongoing, Progressing     Problem: Uterine Tachysystole (Labor)  Goal: Normal Uterine Contraction Pattern  11/15/2020 1423 by Lakisha Bruce RN  Outcome: Adequate for Care Transition  11/15/2020 1024 by Lakisha Bruce RN  Outcome: Ongoing, Progressing   Goal Outcome Evaluation:

## 2020-11-15 NOTE — ANESTHESIA PREPROCEDURE EVALUATION
Anesthesia Evaluation     Patient summary reviewed and Nursing notes reviewed   NPO Solid Status: > 8 hours  NPO Liquid Status: > 8 hours           Airway   Mallampati: II  TM distance: >3 FB  Neck ROM: full  No difficulty expected  Dental      Pulmonary - negative pulmonary ROS   Cardiovascular - negative cardio ROS        Neuro/Psych- negative ROS  GI/Hepatic/Renal/Endo - negative ROS     Musculoskeletal (-) negative ROS    Abdominal    Substance History - negative use     OB/GYN    (+) Pregnant,         Other - negative ROS                       Anesthesia Plan    ASA 2     epidural       Anesthetic plan, all risks, benefits, and alternatives have been provided, discussed and informed consent has been obtained with: patient and spouse/significant other.

## 2020-11-15 NOTE — NURSING NOTE
"Nursing at bedside.  Noted pt's  had repositioned pt to her left side.  Peanut ball in place.  Pt's  adjusting efm.   ask to call nursing for repositioning due to epidural in place.  Epidural site checked.   states \"I will not move her again without calling u\".  Pt states she is comfortable at this time.   "

## 2020-11-15 NOTE — NURSING NOTE
Dr Belle called on her cell.  Informed of pt's arrival and sve of 4-5 cm. Informed of srom  This am.  Will do routine labor orders, labs and covid test.  Will place epidural and call soo for delivery

## 2020-11-15 NOTE — PLAN OF CARE
Problem: Breastfeeding  Goal: Effective Breastfeeding  Outcome: Ongoing, Progressing  Intervention: Promote Breast Care and Comfort  Flowsheets (Taken 11/15/2020 1635)  Breast Care: Breastfeeding: frequency of feeding adjusted  Intervention: Promote Effective Breastfeeding  Flowsheets (Taken 11/15/2020 1635)  Breastfeeding Assistance:   feeding on demand promoted   feeding cue recognition promoted  Parent/Child Attachment Promotion:   strengths emphasized   positive reinforcement provided   rooming-in promoted   skin-to-skin contact encouraged   participation in care promoted  Intervention: Support Exclusive Breastfeeding Success  Flowsheets (Taken 11/15/2020 1635)  Supportive Measures: positive reinforcement provided  Breastfeeding Support: lactation counseling provided   Goal Outcome Evaluation:

## 2020-11-15 NOTE — LACTATION NOTE
11/15/20 1635   Maternal Information   Date of Referral 11/15/20   Person Making Referral other (see comments)  (courtesy)   Maternal Infant Feeding   Maternal Emotional State independent;receptive;relaxed   Milk Expression/Equipment   Breast Pump Type double electric, personal     Mom states she nursed her first child x3 mos and lost her supply and thinks it was due to birth control. Enc to discuss with her MD this time about preventing that this time. State she nursed this baby well after birth. Enc to observe for feeding cues, feed on demand, and in skin to skin. Enc to call for asst as needed.

## 2020-11-15 NOTE — L&D DELIVERY NOTE
11/15/2020    Patient:Lazara Sanchez    MR#:[id number]    Vaginal Delivery Note  35 y.o. yo female  at 39w3d    Patient Active Problem List   Diagnosis   • UTI (urinary tract infection) in pregnancy in third trimester   • Advanced maternal age in multigravida   • Normal labor       Delivery     Delivery: Vaginal, Spontaneous     YOB: 2020    Time of Birth: 1:42 PM      Anesthesia: Epidural     Delivering clinician: Lisa Belle    Forceps?   No   Vacuum? No    Shoulder dystocia present: No     Pt pushed approx 2 hours, and delivered without complication.      Infant    Findings: male  infant     Infant observations: Weight: 4000 g (8 lb 13.1 oz)     Observations/Comments:        Apgars:   @ 1 minute /      @ 5 minutes         Placenta, Cord, and Fluid    Placenta delivered    at       Cord: 3 vessels  present.   Nuchal Cord?  yes; Number of nuchal loops present:1      Cord blood obtained: Yes    Cord gases obtained:  No    Cord gas results: Pending         Repair    Episiotomy: No   Lacerations: Yes  Laceration Information  Laceration Repaired?   Perineal: 1st   yes   Periurethral:       Labial:       Sulcus:       Vaginal:       Cervical:         Suture used for repair: 2-0 Vicryl   Estimated Blood Loss:  200 mls.         Complications  none    Disposition  Mother to Mother Baby/Postpartum  in stable condition currently.  Baby to remains with mom  in stable condition currently.                Lisa Belle MD  11/15/20  14:07 EST

## 2020-11-15 NOTE — ANESTHESIA PROCEDURE NOTES
Labor Epidural      Patient reassessed immediately prior to procedure    Patient location during procedure: OB  Performed By  Anesthesiologist: Elina Connor DO  Preanesthetic Checklist  Completed: patient identified, surgical consent, pre-op evaluation, timeout performed, IV checked, risks and benefits discussed and monitors and equipment checked  Additional Notes  DPE using 25g adi  Prep:  Pt Position:sitting  Sterile Tech:cap, gloves, mask and sterile barrier  Prep:DuraPrep  Monitoring:blood pressure monitoring  Epidural Block Procedure:  Approach:midline  Guidance:palpation technique  Location:L3-L4  Needle Type:Tuohy  Needle Gauge:17 G  Loss of Resistance Medium: air  Loss of Resistance: 5cm  Cath Depth at skin:11 cm  Paresthesia: none  Aspiration:negative  Test Dose:negative  Number of Attempts: 1  Post Assessment:  Dressing:occlusive dressing applied and secured with tape  Pt Tolerance:patient tolerated the procedure well with no apparent complications  Complications:no

## 2020-11-15 NOTE — H&P
History and Physical:    Subjective     Chief Complaint   Patient presents with   • Rupture of Membranes     0630am - contractions now        Lazara Sanchez is a 35 y.o. year old  with an Estimated Date of Delivery: 20 currently at 39w3d presenting with regular contractions and leaking fluid.    Prenatal care has been with Lisa Belle MD.  It has been significant for AMA.        Review of Systems  Pertinent items are noted in HPI.     Past Medical History:   Diagnosis Date   • Acid reflux    • Anemia     iron   • Depression     since    • Eczema    • Heartburn     Takes pepcid everyday for 3 years   • History of kidney stones    • History of MRSA infection    • HPV (human papilloma virus) infection     genital warts removed    • Migraine    • Postpartum depression     with G1   • Scoliosis     As a kid   • Seasonal allergies      Past Surgical History:   Procedure Laterality Date   • APPENDECTOMY  2018   • SESAMOIDECTOMY FIRST TOE Left    • WISDOM TOOTH EXTRACTION       Family History   Problem Relation Age of Onset   • Diabetes Paternal Grandfather    • Stroke Paternal Grandmother    • Hypertension Paternal Grandmother    • Breast cancer Maternal Grandmother    • Hypertension Maternal Grandmother    • Hypothyroidism Maternal Grandmother    • Melanoma Maternal Grandfather    • Ovarian cancer Maternal Aunt    • Breast cancer Maternal Aunt    • Hypothyroidism Maternal Aunt      Social History     Tobacco Use   • Smoking status: Never Smoker   • Smokeless tobacco: Never Used   Substance Use Topics   • Alcohol use: Not Currently   • Drug use: Never     Medications Prior to Admission   Medication Sig Dispense Refill Last Dose   • famotidine (PEPCID) 10 MG tablet Take 10 mg by mouth 2 (Two) Times a Day.   2020 at Unknown time   • Ferrous Sulfate (IRON PO) Take  by mouth Daily.   Past Week at Unknown time   • fexofenadine (ALLEGRA) 180 MG tablet Take 180 mg by mouth  "Daily.   2020 at Unknown time   • FIBER ADULT GUMMIES PO Take 2 tablets by mouth Daily.   2020 at Unknown time   • Melatonin 1 MG/4ML liquid Take 1 mL by mouth As Needed.   Past Week at Unknown time   • Prenatal Vit-Fe Fumarate-FA (PRENATAL 19) chewable tablet    2020 at Unknown time   • acetaminophen (TYLENOL) 500 MG tablet Take 1,000 mg by mouth Every 6 (Six) Hours As Needed for Mild Pain .        Allergies:  Adhesive tape  OB History    Para Term  AB Living   2 1 1 0 0 1   SAB TAB Ectopic Molar Multiple Live Births   0 0 0 0 0 1      # Outcome Date GA Lbr Shankar/2nd Weight Sex Delivery Anes PTL Lv   2 Current            1 Term 17 39w3d 15:35 / 02:07 3640 g (8 lb 0.4 oz) F Vag-Spont EPI N MAURICIO         Objective     /55   Pulse 90   Temp 98.1 °F (36.7 °C) (Oral)   Resp 18   Ht 170.2 cm (67\")   Wt 79.8 kg (176 lb)   LMP 2020   Breastfeeding Yes   BMI 27.57 kg/m²     Physical Exam    General:  No acute distress           Abdomen: Gravid, nontender       FHT's: reactive    Cervix: 4-5   [place]: Contraction are q2-3 min     Lab Review   External Prenatal Results     Pregnancy Outside Results - Transcribed From Office Records - See Scanned Records For Details     Test Value Date Time    Hgb 12.3 g/dL 11/15/20 0820    Hct 37.5 % 11/15/20 0820    ABO A  11/15/20 0820    Rh Positive  11/15/20 0820    Antibody Screen Negative  11/15/20 0820    Glucose Fasting GTT       Glucose Tolerance Test 1 hour       Glucose Tolerance Test 3 hour       Gonorrhea (discrete) Negative  17     Chlamydia (discrete) Negative  17     RPR Negative  17     VDRL       Syphilis Antibody       Rubella Immune  17     HBsAg       Herpes Simplex Virus PCR       Herpes Simplex VIrus Culture       HIV       Hep C RNA Quant PCR       Hep C Antibody       AFP       Group B Strep No Group B Streptococcus isolated  10/27/20 1634    GBS Susceptibility to Clindamycin       GBS " Susceptibility to Erythromycin       Fetal Fibronectin       Genetic Testing, Maternal Blood             Drug Screening     Test Value Date Time    Urine Drug Screen Negative  02/07/17     Amphetamine Screen       Barbiturate Screen       Benzodiazepine Screen       Methadone Screen       Phencyclidine Screen       Opiates Screen       THC Screen       Cocaine Screen       Propoxyphene Screen       Buprenorphine Screen       Methamphetamine Screen       Oxycodone Screen       Tricyclic Antidepressants Screen                       Assessment/Plan     ASSESSMENT  1. IUP at 39w3d  2. labor and rupture of membranes   3. GBBSnegative  PLAN  1. Admit to labor and delivery   2.  exp management.          Lisa Belle MD  11/15/2020@

## 2020-11-16 LAB
BASOPHILS # BLD AUTO: 0.08 10*3/MM3 (ref 0–0.2)
BASOPHILS NFR BLD AUTO: 0.5 % (ref 0–1.5)
DEPRECATED RDW RBC AUTO: 45.4 FL (ref 37–54)
EOSINOPHIL # BLD AUTO: 0.2 10*3/MM3 (ref 0–0.4)
EOSINOPHIL NFR BLD AUTO: 1.4 % (ref 0.3–6.2)
ERYTHROCYTE [DISTWIDTH] IN BLOOD BY AUTOMATED COUNT: 13.6 % (ref 12.3–15.4)
HCT VFR BLD AUTO: 36.2 % (ref 34–46.6)
HGB BLD-MCNC: 11.3 G/DL (ref 12–15.9)
IMM GRANULOCYTES # BLD AUTO: 0.1 10*3/MM3 (ref 0–0.05)
IMM GRANULOCYTES NFR BLD AUTO: 0.7 % (ref 0–0.5)
LYMPHOCYTES # BLD AUTO: 2.04 10*3/MM3 (ref 0.7–3.1)
LYMPHOCYTES NFR BLD AUTO: 13.9 % (ref 19.6–45.3)
MCH RBC QN AUTO: 28.8 PG (ref 26.6–33)
MCHC RBC AUTO-ENTMCNC: 31.2 G/DL (ref 31.5–35.7)
MCV RBC AUTO: 92.3 FL (ref 79–97)
MONOCYTES # BLD AUTO: 0.57 10*3/MM3 (ref 0.1–0.9)
MONOCYTES NFR BLD AUTO: 3.9 % (ref 5–12)
NEUTROPHILS NFR BLD AUTO: 11.65 10*3/MM3 (ref 1.7–7)
NEUTROPHILS NFR BLD AUTO: 79.6 % (ref 42.7–76)
NRBC BLD AUTO-RTO: 0 /100 WBC (ref 0–0.2)
PLATELET # BLD AUTO: 199 10*3/MM3 (ref 140–450)
PMV BLD AUTO: 10.5 FL (ref 6–12)
RBC # BLD AUTO: 3.92 10*6/MM3 (ref 3.77–5.28)
WBC # BLD AUTO: 14.64 10*3/MM3 (ref 3.4–10.8)

## 2020-11-16 PROCEDURE — 85025 COMPLETE CBC W/AUTO DIFF WBC: CPT | Performed by: OBSTETRICS & GYNECOLOGY

## 2020-11-16 RX ADMIN — DOCUSATE SODIUM 100 MG: 100 CAPSULE ORAL at 19:59

## 2020-11-16 RX ADMIN — IBUPROFEN 600 MG: 600 TABLET, FILM COATED ORAL at 03:43

## 2020-11-16 RX ADMIN — IBUPROFEN 600 MG: 600 TABLET, FILM COATED ORAL at 17:02

## 2020-11-16 RX ADMIN — IBUPROFEN 600 MG: 600 TABLET, FILM COATED ORAL at 10:07

## 2020-11-16 RX ADMIN — WITCH HAZEL 1 PAD: 500 SOLUTION RECTAL; TOPICAL at 19:59

## 2020-11-16 RX ADMIN — DOCUSATE SODIUM 100 MG: 100 CAPSULE ORAL at 10:07

## 2020-11-16 NOTE — ANESTHESIA POSTPROCEDURE EVALUATION
Patient: Lazara Bateslps    Procedure Summary     Date: 11/15/20 Room / Location:     Anesthesia Start: 0841 Anesthesia Stop: 1350    Procedure: LABOR ANALGESIA Diagnosis:     Scheduled Providers:  Provider: Elina Connor DO    Anesthesia Type: epidural ASA Status: 2          Anesthesia Type: epidural    Vitals  Vitals Value Taken Time   BP 98/56 11/16/20 0900   Temp 99.2 °F (37.3 °C) 11/16/20 0900   Pulse 94 11/16/20 0900   Resp 18 11/16/20 0900   SpO2             Post Anesthesia Care and Evaluation    Patient location during evaluation: bedside  Patient participation: complete - patient participated  Level of consciousness: awake and alert  Pain management: adequate  Airway patency: patent  Anesthetic complications: No anesthetic complications    Cardiovascular status: acceptable  Respiratory status: acceptable  Hydration status: acceptable  Post Neuraxial Block status: Motor and sensory function returned to baseline and No signs or symptoms of PDPH

## 2020-11-16 NOTE — PROGRESS NOTES
11/16/2020  PPD #1    Subjective   Lazara feels well.  Patient describes her lochia less than menses.  Pain is well controlled       Objective   Temp: Temp:  [98.1 °F (36.7 °C)-98.9 °F (37.2 °C)] 98.5 °F (36.9 °C) Temp src: Oral   BP: BP: ()/(39-78) 113/56        Pulse: Heart Rate:  [] 90  RR: Resp:  [16-20] 16    General:  No acute distress   Abdomen: Fundus firm and beneath umbilicus   Pelvis: deferred     Lab Results   Component Value Date    WBC 14.64 (H) 11/16/2020    HGB 11.3 (L) 11/16/2020    HCT 36.2 11/16/2020    MCV 92.3 11/16/2020     11/16/2020       Assessment  1. PPD# 1 after vaginal delivery   2. MBT= A positive  3. Infant male, desires circ  4. Breastfeeding    Plan  1. Routine postpartum care.      This note has been electronically signed.    Yael Foley, APRN  November 16, 2020

## 2020-11-17 VITALS
DIASTOLIC BLOOD PRESSURE: 71 MMHG | HEIGHT: 67 IN | WEIGHT: 176 LBS | SYSTOLIC BLOOD PRESSURE: 121 MMHG | RESPIRATION RATE: 20 BRPM | BODY MASS INDEX: 27.62 KG/M2 | HEART RATE: 107 BPM | TEMPERATURE: 98.4 F

## 2020-11-17 PROBLEM — O23.43 UTI (URINARY TRACT INFECTION) IN PREGNANCY IN THIRD TRIMESTER: Status: RESOLVED | Noted: 2020-08-28 | Resolved: 2020-11-17

## 2020-11-17 PROBLEM — O09.529 ADVANCED MATERNAL AGE IN MULTIGRAVIDA: Status: RESOLVED | Noted: 2020-09-17 | Resolved: 2020-11-17

## 2020-11-17 PROBLEM — Z37.9 NORMAL LABOR: Status: RESOLVED | Noted: 2020-11-15 | Resolved: 2020-11-17

## 2020-11-17 PROCEDURE — 0503F POSTPARTUM CARE VISIT: CPT | Performed by: NURSE PRACTITIONER

## 2020-11-17 RX ORDER — IBUPROFEN 600 MG/1
600 TABLET ORAL EVERY 6 HOURS PRN
Qty: 30 TABLET | Refills: 0 | Status: SHIPPED | OUTPATIENT
Start: 2020-11-17 | End: 2022-10-28

## 2020-11-17 RX ORDER — IBUPROFEN 600 MG/1
600 TABLET ORAL EVERY 6 HOURS PRN
Qty: 30 TABLET | Refills: 0 | Status: SHIPPED | OUTPATIENT
Start: 2020-11-17 | End: 2020-11-17

## 2020-11-17 RX ADMIN — IBUPROFEN 600 MG: 600 TABLET, FILM COATED ORAL at 16:08

## 2020-11-17 RX ADMIN — IBUPROFEN 600 MG: 600 TABLET, FILM COATED ORAL at 00:00

## 2020-11-17 RX ADMIN — DOCUSATE SODIUM 100 MG: 100 CAPSULE ORAL at 09:25

## 2020-11-17 RX ADMIN — IBUPROFEN 600 MG: 600 TABLET, FILM COATED ORAL at 09:25

## 2020-11-17 NOTE — LACTATION NOTE
11/16/20 1235   Maternal Information   Person Making Referral nurse;patient   Maternal Reason for Referral latch difficulty  (painful latch)   Maternal Assessment   Breast Size Issue none   Breast Shape Bilateral:;round   Breast Density Bilateral:;soft   Nipples Bilateral:;short   Left Nipple Symptoms abraded;redness;painful   Right Nipple Symptoms abraded;painful;redness  (compression stripe)   Milk Expression/Equipment   Breast Pump Type double electric, personal  (demosntrated personal pump--mom will pump now)   Breast Pump Flange Type hard   Breast Pump Flange Size 24 mm   Breast Pumping   Breast Pumping Interventions post-feed pumping encouraged   Teaching done as documented under Education. Discussed medications to treat postpartum depression--encouraged to discussed with OB or PCP. To call lactation services, if there are questions or concerns or help with a feeding.

## 2020-11-17 NOTE — PLAN OF CARE
Goal Outcome Evaluation:      VSS, lochia light. Pain controlled with motrin.  Problem: Adjustment to Role Transition (Postpartum Vaginal Delivery)  Goal: Successful Maternal Role Transition  Outcome: Met  Intervention: Support Maternal Role Transition  Recent Flowsheet Documentation  Taken 11/17/2020 0925 by Whit Echevarria RN  Supportive Measures: active listening utilized  Parent/Child Attachment Promotion: interaction encouraged     Problem: Bleeding (Postpartum Vaginal Delivery)  Goal: Hemostasis  Outcome: Met     Problem: Infection (Postpartum Vaginal Delivery)  Goal: Absence of Infection Signs and Symptoms  Outcome: Met     Problem: Pain (Postpartum Vaginal Delivery)  Goal: Acceptable Pain Control  Outcome: Met  Intervention: Prevent or Manage Pain  Recent Flowsheet Documentation  Taken 11/17/2020 0925 by Whit Echevarria RN  Pain Management Interventions: see MAR     Problem: Urinary Retention (Postpartum Vaginal Delivery)  Goal: Effective Urinary Elimination  Outcome: Met     Problem: Breastfeeding  Goal: Effective Breastfeeding  Outcome: Met  Intervention: Promote Effective Breastfeeding  Recent Flowsheet Documentation  Taken 11/17/2020 0925 by Whit Echevarria RN  Parent/Child Attachment Promotion: interaction encouraged  Intervention: Support Exclusive Breastfeeding Success  Recent Flowsheet Documentation  Taken 11/17/2020 0925 by Whit Echevarria RN  Supportive Measures: active listening utilized  Breastfeeding Support: encouragement provided

## 2020-11-17 NOTE — DISCHARGE SUMMARY
Discharge Summary    Date of Admission: 11/15/2020  Date of Discharge:  2020      Patient: Lazara Sanchez      MR#:[id number]    Delivery Provider: Lisa Dick Diagnosis: Vaginal delivery at 39w3d    Procedures:  Vaginal, Spontaneous     11/15/2020    1:42 PM        Hospital Course  Patient is a 35 y.o. female  at 39w3d status post vaginal delivery without complication. Postpartum the patient did well. She remained afebrile, with vital signs stable. She was ready for discharge on postpartum day 2.     Infant:   male  fetus 4000 g (8 lb 13.1 oz)  with Apgar scores of 6 , 7  at five minutes.    Condition on Discharge:  Stable    Vital Signs  Temp:  [98.7 °F (37.1 °C)-99.5 °F (37.5 °C)] 98.7 °F (37.1 °C)  Heart Rate:  [] 69  Resp:  [16-18] 16  BP: ()/(56-72) 109/57    Lab Results   Component Value Date    WBC 14.64 (H) 2020    HGB 11.3 (L) 2020    HCT 36.2 2020    MCV 92.3 2020     2020       Discharge Disposition  Home or Self Care    Discharge Medications     Discharge Medications      New Medications      Instructions Start Date   ibuprofen 600 MG tablet  Commonly known as: ADVIL,MOTRIN   600 mg, Oral, Every 6 Hours PRN         Continue These Medications      Instructions Start Date   acetaminophen 500 MG tablet  Commonly known as: TYLENOL   1,000 mg, Oral, Every 6 Hours PRN      famotidine 10 MG tablet  Commonly known as: PEPCID   10 mg, Oral, 2 Times Daily      fexofenadine 180 MG tablet  Commonly known as: ALLEGRA   180 mg, Oral, Daily      FIBER ADULT GUMMIES PO   2 tablets, Oral, Daily      IRON PO   Oral, Daily      Melatonin 1 MG/4ML liquid   1 mL, Oral, As Needed      Prenatal 19 chewable tablet   No dose, route, or frequency recorded.             Follow-up Appointments    Additional Instructions for the Follow-ups that You Need to Schedule     Discharge Follow-up with Specified Provider: soo   As directed      To: soo            6 weeks in office.      Kelsea Crystal, APRN  11/17/20  08:07 EST  Csd

## 2020-11-17 NOTE — LACTATION NOTE
11/17/20 1030   Maternal Information   Person Making Referral other (see comments)  (Courtesy visit)   Maternal Reason for Referral other (see comments)  (Reviewed pumping with hospital pump)   Infant Reason for Referral NICU admission   Milk Expression/Equipment   Breast Pump Type double electric, personal;other (see comments)  (Has Spectra pump for home use. Reviewed use of Spectra)

## 2020-11-17 NOTE — LACTATION NOTE
11/16/20 1815   Maternal Information   Person Making Referral   (fu consult)   Maternal Reason for Referral latch difficulty   Infant Reason for Referral   (using nipple shield)   Maternal Assessment   Breast Size Issue none   Breast Shape Bilateral:;round   Breast Density Bilateral:;soft   Nipples Bilateral:;short   Maternal Infant Feeding   Maternal Emotional State receptive;independent   Infant Positioning clutch/football   Signs of Milk Transfer deep jaw excursions noted   Pain with Feeding no   Comfort Measures Before/During Feeding infant position adjusted;latch adjusted;maternal position adjusted  (small nipple shield)   Latch Assistance none needed   Milk Expression/Equipment   Breast Pump Type double electric, personal   Breast Pump Flange Type hard   Breast Pump Flange Size 24 mm   Breast Pumping   Breast Pumping Interventions   (can pump after feedings, if pain or if baby too sleepy )   Mom tried small nipple shield but it was painful. Medium shield was better--with no pain during feeding. Mom will continue working on latch and position. To call lactation services, if there are questions or concerns or if mom wants help with feeding tomorrow.

## 2020-12-15 PROCEDURE — U0004 COV-19 TEST NON-CDC HGH THRU: HCPCS | Performed by: NURSE PRACTITIONER

## 2020-12-29 ENCOUNTER — POSTPARTUM VISIT (OUTPATIENT)
Dept: OBSTETRICS AND GYNECOLOGY | Facility: CLINIC | Age: 35
End: 2020-12-29

## 2020-12-29 VITALS — HEIGHT: 67 IN | BODY MASS INDEX: 25.36 KG/M2 | WEIGHT: 161.6 LBS

## 2020-12-29 DIAGNOSIS — Z30.011 ENCOUNTER FOR INITIAL PRESCRIPTION OF CONTRACEPTIVE PILLS: ICD-10-CM

## 2020-12-29 PROCEDURE — 0503F POSTPARTUM CARE VISIT: CPT | Performed by: NURSE PRACTITIONER

## 2020-12-29 RX ORDER — ACETAMINOPHEN AND CODEINE PHOSPHATE 120; 12 MG/5ML; MG/5ML
1 SOLUTION ORAL DAILY
Qty: 28 TABLET | Refills: 12 | Status: SHIPPED | OUTPATIENT
Start: 2020-12-29 | End: 2022-04-22

## 2020-12-29 NOTE — PROGRESS NOTES
"Chief Complaint   Patient presents with   • Postpartum Care       6 Week Postpartum Visit         Lazara Sanchez is a 35 y.o.  s/p  at 39 weeks on 11/15/20, who presents today for a 6 week postpartum check.      At the time of delivery were you diagnosed with any of the following: None. The laceration was 1st degree and is healing well.    Patient denies postpartum depression.  Patient describes bleeding as absent.  Patient is breast feeding.  Desires POP for contraception, states last time the BUNNY affected her milk supply. Patient reports decrease in bowel movements.     Postpartum Depression Screening Questionnaire: 3, no treatment indicated.  Baby Name: August  Baby Weight: 8lbs 13.1oz  Baby Discharged: To NICU for 2 days  Delivering Physician: [place]    Last Completed Pap Smear       Status Date      PAP SMEAR Done 3/26/2020 Negative        Is the patient due for a pap today? No    The additional following portions of the patient's history were reviewed and updated as appropriate: allergies, current medications, past family history, past medical history, past social history, past surgical history and problem list.    Review of Systems   Constitutional: Negative.    HENT: Negative.    Eyes: Negative.    Respiratory: Negative.    Cardiovascular: Negative.    Gastrointestinal: Negative.    Endocrine: Negative.    Genitourinary: Negative.    Musculoskeletal: Negative.    Skin: Negative.    Allergic/Immunologic: Negative.    Neurological: Negative.    Hematological: Negative.    Psychiatric/Behavioral: Negative.      All other systems reviewed and are negative.     I have reviewed and agree with the HPI, ROS, and historical information as entered above. Kathy Bee, APRN    Ht 170.2 cm (67\")   Wt 73.3 kg (161 lb 9.6 oz)   LMP 2020   Breastfeeding Yes   BMI 25.31 kg/m²     Physical Exam  Vitals signs and nursing note reviewed. Exam conducted with a chaperone present.   Constitutional:       " Appearance: Normal appearance.   Pulmonary:      Effort: Pulmonary effort is normal.   Abdominal:      General: Abdomen is flat.      Palpations: Abdomen is soft.   Genitourinary:     Labia:         Right: No rash, tenderness or lesion.         Left: No rash, tenderness or lesion.       Vagina: Normal. No lesions.      Cervix: No cervical motion tenderness, discharge, lesion or cervical bleeding.      Uterus: Normal. Not enlarged, not fixed and not tender.       Adnexa:         Right: No mass or tenderness.          Left: No mass or tenderness.        Rectum: No external hemorrhoid.      Comments: Chaperone Present  Neurological:      Mental Status: She is alert and oriented to person, place, and time.   Psychiatric:         Behavior: Behavior normal.             Assessment and Plan    Problem List Items Addressed This Visit     None      Visit Diagnoses     Postpartum follow-up    -  Primary    Encounter for initial prescription of contraceptive pills              1. S/p Vaginal delivery, 6 weeks postpartum.  Doing well.    2. Return to normal physical activity.  No pelvic restrictions.   3. Contraception: progesterone only pill, discussed use.   4. No s/s PP depression.   5. Will send in Neumans nipple cream to compound pharmacy per pt request.   6. Follow up for Annual, or sooner if needed.     Kathy Bee, APRN  12/29/2020

## 2021-01-22 ENCOUNTER — TELEPHONE (OUTPATIENT)
Dept: OBSTETRICS AND GYNECOLOGY | Facility: CLINIC | Age: 36
End: 2021-01-22

## 2021-01-22 ENCOUNTER — IMMUNIZATION (OUTPATIENT)
Dept: VACCINE CLINIC | Facility: HOSPITAL | Age: 36
End: 2021-01-22

## 2021-01-22 PROCEDURE — 91300 HC SARSCOV02 VAC 30MCG/0.3ML IM: CPT | Performed by: INTERNAL MEDICINE

## 2021-01-22 PROCEDURE — 0001A: CPT | Performed by: INTERNAL MEDICINE

## 2021-01-22 NOTE — TELEPHONE ENCOUNTER
Pt. Called back and left a VM. I attempted to call the patient again and she did not answer, I left another VM

## 2021-01-22 NOTE — TELEPHONE ENCOUNTER
Patient left a message on the nurse line. Believes she has mastitis and wants to discuss symptoms with nurse.

## 2021-02-12 ENCOUNTER — IMMUNIZATION (OUTPATIENT)
Dept: VACCINE CLINIC | Facility: HOSPITAL | Age: 36
End: 2021-02-12

## 2021-02-12 PROCEDURE — 91300 HC SARSCOV02 VAC 30MCG/0.3ML IM: CPT | Performed by: PHYSICIAN ASSISTANT

## 2021-02-12 PROCEDURE — 0002A: CPT | Performed by: PHYSICIAN ASSISTANT

## 2021-05-25 ENCOUNTER — OFFICE VISIT (OUTPATIENT)
Dept: OBSTETRICS AND GYNECOLOGY | Facility: CLINIC | Age: 36
End: 2021-05-25

## 2021-05-25 VITALS
DIASTOLIC BLOOD PRESSURE: 58 MMHG | WEIGHT: 151.2 LBS | HEIGHT: 67 IN | SYSTOLIC BLOOD PRESSURE: 104 MMHG | BODY MASS INDEX: 23.73 KG/M2

## 2021-05-25 DIAGNOSIS — Z30.41 ENCOUNTER FOR SURVEILLANCE OF CONTRACEPTIVE PILLS: Primary | ICD-10-CM

## 2021-05-25 DIAGNOSIS — M62.89 PELVIC FLOOR DYSFUNCTION IN FEMALE: ICD-10-CM

## 2021-05-25 PROCEDURE — 99213 OFFICE O/P EST LOW 20 MIN: CPT | Performed by: NURSE PRACTITIONER

## 2021-05-25 RX ORDER — LEVONORGESTREL AND ETHINYL ESTRADIOL 0.1-0.02MG
1 KIT ORAL DAILY
Qty: 84 TABLET | Refills: 3 | Status: SHIPPED | OUTPATIENT
Start: 2021-05-25 | End: 2022-04-22 | Stop reason: SDUPTHER

## 2021-05-25 NOTE — PROGRESS NOTES
"Chief Complaint   Patient presents with   • Contraception         Subjective   HPI  Lazara Sanchez is a 35 y.o. female, , LMP was on No LMP recorded. (Menstrual status: Oral contraceptives). who presents for problem with current birth control method.    Pt is 6 months postpartum.  She is currently on POP's, and she just stopped breastfeeding. She would like to go back on BUNNY's. Pt states she used to take Famina, and that worked well for her. She has no h/o HTN, clotting disorders, non-smoker.     She has questions regarding diastasis recti and a weakened pelvic floor after having children.     Partner Status: Marital Status: .  New Partners since last visit: no.  Desires STD Screening: no.    Additional OB/GYN History   Last Pap : 2020 normal per pt   Last Completed Pap Smear          PAP SMEAR (Every 3 Years) Next due on 3/26/2023    2020  Done - Negative              History of abnormal Pap smear: no    OB History        2    Para   2    Term   2       0    AB   0    Living   2       SAB   0    TAB   0    Ectopic   0    Molar   0    Multiple   0    Live Births   2                The additional following portions of the patient's history were reviewed and updated as appropriate: current medications, past family history, past medical history, past social history, past surgical history and problem list.    Review of Systems   Constitutional: Negative.    HENT: Negative.    Respiratory: Negative.    Cardiovascular: Negative.    Gastrointestinal: Negative.    Genitourinary: Negative.    Musculoskeletal: Negative.    Skin: Negative.    Allergic/Immunologic: Negative.    Neurological: Negative.    Hematological: Negative.    Psychiatric/Behavioral: Negative.        I have reviewed and agree with the HPI, ROS, and historical information as entered above. Kathy Bee, APRN    Objective   /58   Ht 170.2 cm (67\")   Wt 68.6 kg (151 lb 3.2 oz)   Breastfeeding No   BMI 23.68 " kg/m²     Physical Exam  Vitals and nursing note reviewed.   Constitutional:       Appearance: Normal appearance.   HENT:      Head: Normocephalic and atraumatic.   Pulmonary:      Effort: Pulmonary effort is normal.   Abdominal:      General: Abdomen is flat.      Palpations: Abdomen is soft.      Comments: No sign of diastasis recti. Possibly small umbilical hernia.    Neurological:      Mental Status: She is alert and oriented to person, place, and time.   Psychiatric:         Behavior: Behavior normal.         Assessment/Plan     Assessment     Problem List Items Addressed This Visit     None      Visit Diagnoses     Encounter for surveillance of contraceptive pills    -  Primary    Pelvic floor dysfunction in female        Relevant Orders    Ambulatory Referral to Physical Therapy Pelvic Floor          1. Changed oral contraceptive pill back to Famina, which she had taken previously. Reviewed side effects and risks of BUNNY's.   2. Will refer to pelvic PT to eval for any pelvic floor dysfunction.       Kathy Bee, APRN  05/25/2021

## 2021-07-13 ENCOUNTER — TREATMENT (OUTPATIENT)
Dept: PHYSICAL THERAPY | Facility: CLINIC | Age: 36
End: 2021-07-13

## 2021-07-13 DIAGNOSIS — N39.3 STRESS INCONTINENCE: ICD-10-CM

## 2021-07-13 DIAGNOSIS — N81.89 PELVIC FLOOR WEAKNESS: Primary | ICD-10-CM

## 2021-07-13 DIAGNOSIS — M62.89 PELVIC FLOOR DYSFUNCTION IN FEMALE: ICD-10-CM

## 2021-07-13 DIAGNOSIS — R19.8 ABDOMINAL WEAKNESS: ICD-10-CM

## 2021-07-13 PROCEDURE — 97161 PT EVAL LOW COMPLEX 20 MIN: CPT | Performed by: PHYSICAL THERAPIST

## 2021-07-13 PROCEDURE — 97110 THERAPEUTIC EXERCISES: CPT | Performed by: PHYSICAL THERAPIST

## 2021-07-13 NOTE — PROGRESS NOTES
Physical Therapy Initial Evaluation and Plan of Care    Patient: Lazara Sanchez   : 1985  Diagnosis/ICD-10 Code:  No primary diagnosis found.  Referring practitioner: LORENA Lundy  Date of Initial Visit: 2021  Today's Date: 2021  Patient seen for Visit count could not be calculated. Make sure you are using a visit which is associated with an episode. sessions      Subjective      Pt 8 months postpartum. She is concerned about CLAUDIA and pelvic floor weakness. Stomach is different shape, still feeling numbness in that area. Was having leakage after her first but never got treated.     Chief Complaint: No chief complaint on file.     Functional Outcome Measure: LEONA 6: 37.5%    Pain  Denies issues     Bladder function:      Incontinence: not daily - 2x/week  # of pads in 24 hours: occasional use   What specifically makes you leak: jumping jacks/rope; coughing/sneezing mild - but vigorous exercise is tricky.   Leak at night: not now, used to  Urination at night: 2x because up with baby  Use bathroom “just in case”: yes  Strong urinary urge: infrequently  Leaking with urge: no   Urge triggers: none  Complete bladder voiding: emptying fully   Urinary splaying: no  Post-micturition dribble: no  Frequency of urination: every hour  Discomfort with urination: no  Vaginal or anal itching: no  Fluid consumed daily: 80 oz per day of water     Bowel function:  Denies issues       Sexual activity  Sexually active: denies issues       Prior PT or other treatment: none    Diagnostics:    PMH:   Past Medical History:   Diagnosis Date   • Acid reflux    • Anemia     iron   • Depression     since    • Eczema    • Heartburn     Takes pepcid everyday for 3 years   • History of kidney stones    • History of MRSA infection    • HPV (human papilloma virus) infection     genital warts removed    • Migraine    • Postpartum depression     with G1   • Scoliosis     As a kid   • Seasonal allergies          Surgical HX:   Past Surgical History:   Procedure Laterality Date   • APPENDECTOMY  2018   • SESAMOIDECTOMY FIRST TOE Left    • WISDOM TOOTH EXTRACTION          Menstrual cycle: resumed - irregular and just switched off mini pill    Birth HX  - vaginal; first degree tears with both, long pushing    Meds:   Current Outpatient Medications:   •  acetaminophen (TYLENOL) 500 MG tablet, Take 1,000 mg by mouth Every 6 (Six) Hours As Needed for Mild Pain ., Disp: , Rfl:   •  famotidine (PEPCID) 10 MG tablet, Take 10 mg by mouth 2 (Two) Times a Day., Disp: , Rfl:   •  Ferrous Sulfate (IRON PO), Take  by mouth Daily., Disp: , Rfl:   •  fexofenadine (ALLEGRA) 180 MG tablet, Take 180 mg by mouth Daily., Disp: , Rfl:   •  FIBER ADULT GUMMIES PO, Take 2 tablets by mouth Daily., Disp: , Rfl:   •  ibuprofen (ADVIL,MOTRIN) 600 MG tablet, Take 1 tablet by mouth Every 6 (Six) Hours As Needed for Mild Pain ., Disp: 30 tablet, Rfl: 0  •  levonorgestrel-ethinyl estradiol (AVIANE,ALESSE,LESSINA) 0.1-20 MG-MCG per tablet, Take 1 tablet by mouth Daily., Disp: 84 tablet, Rfl: 3  •  Melatonin 1 MG/4ML liquid, Take 1 mL by mouth As Needed., Disp: , Rfl:   •  norethindrone (MICRONOR) 0.35 MG tablet, Take 1 tablet by mouth Daily., Disp: 28 tablet, Rfl: 12  •  Prenatal Vit-Fe Fumarate-FA (PRENATAL 19) chewable tablet, , Disp: , Rfl:      Occupation: freelancer    Activity level/exercise routine: 5 days per week 30 minute strength training    Diet/Food intake: nutritional program just started and increased water          Objective      verbal consent obtained for internal pelvic exam/treatment with declined need for second person in room    Palpation:   Supine:   Abdominals, Iliacus, psoas - unremarkable for tension B - unremarkable for CLAUDIA; decreased activation of obliques and TrA - 2/5  + B for load transfer dysfunction with ASLR  Adductors unremarkable for tension B    External:    Ischiocavernosus - unremarkable for  tension B   Supf and deep transverse perineal mm - unremarkable for tension B   Sensation intact B to light touch at perineum    Internal:   Sensation: intact B  Bulge: intact  Knack: absent  Wall Laxity: mild anterior     L/R supf mm: unremarkable for tension B   Levator ani: unremarkable for tension B   Obturator internus: unremarkable for tension B   Compressor urethra: unremarkable for tension B    PERF SCALE:  Power: 2    Endurance: 3    Repetitions: 4    Fast twitch: 5        Assessment/Plan:     The patient is a 35 y.o. female who presents to physical therapy today for abdominal weakness and stress incontinence. Upon initial evaluation, the patient demonstrates the following impairments: decreased strength and endurance of pelvic floor and abdominal mm. Due to these impairments, the patient is unable to exercise/cough/sneeze without incontinence. The patient would benefit from skilled pelvic PT services to address functional limitations and impairments and to improve patient quality of life.      Goals:   STG's: 4 weeks  · Patient will report > 25% improvement in urinary symptoms  · Patient will be I with pelvic brace for impact exercise  · Patient will be able to perform HEP with minimal verbal cues    LTG's: By discharge    · Patient will report >75% improvement in urinary symptoms   · Patient will be able to exercise without incontinence for improved QOL  · Patient will be independent with HEP    Plan  Therapy options: will be seen for skilled physical therapy services  Planned modality interventions: TENS, ultrasound, cryotherapy, thermotherapy (hydrocollator packs) and high voltage pulsed current (pain management)  Planned therapy interventions: abdominal trunk stabilization, manual therapy, neuromuscular re-education, body mechanics training, flexibility, functional ROM exercises, gait training, home exercise program, joint mobilization, therapeutic activities, stretching, strengthening, spinal/joint  mobilization, soft tissue mobilization and postural training  Pt prognosis: good  Frequency: weekly  Duration in visits: 9  Duration in weeks: 14  Treatment plan discussed with: patient    1356/1430  Timed:         Manual Therapy:    0     mins  90435;     Therapeutic Exercise:    8     mins  81038;     Neuromuscular Martha:    0    mins  80718;    Therapeutic Activity:     0     mins  72806;     Gait Trainin     mins  59327;     Ultrasound:     0     mins  20152;    Ionto                               0    mins   53416  Self Care                       0     mins   43873  Canalith Repos    0     mins 05935      Un-Timed:  Electrical Stimulation:    0     mins  39753 ( );  Dry Needling     0     mins self-pay  Traction     0     mins 67938  Low Eval     26     Mins  50360  Mod Eval     0     Mins  83328  High Eval                       0     Mins  72198  Re-Eval                           0    mins  52450        Timed Treatment:   8   mins   Total Treatment:     34   mins    PT SIGNATURE:Elier Kim PT, DPT  DATE TREATMENT INITIATED: 2021    Initial Certification  Certification Period: 10/11/2021  I certify that the therapy services are furnished while this patient is under my care.  The services outlined above are required by this patient, and will be reviewed every 90 days.     PHYSICIAN: Kathy Bee, APRN      DATE: 2021     Please sign and return via fax to [phone]. Thank you, [facility].

## 2021-07-20 ENCOUNTER — TREATMENT (OUTPATIENT)
Dept: PHYSICAL THERAPY | Facility: CLINIC | Age: 36
End: 2021-07-20

## 2021-07-20 DIAGNOSIS — R19.8 ABDOMINAL WEAKNESS: ICD-10-CM

## 2021-07-20 DIAGNOSIS — N81.89 PELVIC FLOOR WEAKNESS: ICD-10-CM

## 2021-07-20 DIAGNOSIS — M62.89 PELVIC FLOOR DYSFUNCTION IN FEMALE: Primary | ICD-10-CM

## 2021-07-20 DIAGNOSIS — N39.3 STRESS INCONTINENCE: ICD-10-CM

## 2021-07-20 PROCEDURE — 97112 NEUROMUSCULAR REEDUCATION: CPT | Performed by: PHYSICAL THERAPIST

## 2021-07-20 PROCEDURE — 97110 THERAPEUTIC EXERCISES: CPT | Performed by: PHYSICAL THERAPIST

## 2021-07-20 NOTE — PROGRESS NOTES
Physical Therapy Daily Progress Note  Patient: Lazara Sanchez   : 1985  Diagnosis/ICD-10 Code:  Pelvic floor dysfunction in female [M62.89]  Referring practitioner: No ref. provider found  Date of Initial Visit: Type: THERAPY  Noted: 2021  Today's Date: 2021  Patient seen for 2 sessions      Subjective   Lazara Sanchez reports no change since last visit. Doing HEP daily.   Pain Rating (0-10): 0      Objective   verbal consent obtained for external pelvic exam/treatment with declined need for second person in room     SEMG: supine:  3.2, avg 13.8, max 32.3    See Exercise, Manual, and Modality Logs for complete treatment.       Assessment/Plan  Pt compliant with HEP and able to demo with minimal substitution. She requires increased time and cues for relaxation between contractions. This improved with practice with sEMG. HEP was progressed to include resistance training in seated and to incorporate abdominal strengthening. Will continue to progress as indicated to reduce bladder symptoms.     Progress per Plan of Care         1432/1510   Timed:         Manual Therapy:    0     mins  72802;     Therapeutic Exercise:    15     mins  30479;     Neuromuscular Martha:    23    mins  59274;    Therapeutic Activity:     0     mins  89449;     Gait Trainin     mins  66010;     Ultrasound:     0     mins  64081;    Ionto                               0    mins   38330  Self Care                       0     mins   07265  Canalith Repos               0    mins  34572    Un-Timed:  Electrical Stimulation:    0     mins  59708 ( );  Dry Needling     0     mins self-pay  Traction     0     mins 92623  Low Eval     0     Mins  52177  Mod Eval     0     Mins  96232  High Eval                       0     Mins  36924  Re-Eval                           0    mins  37042    Timed Treatment:   38   mins   Total Treatment:     38   mins    Elier Kim PT  [place] License # [id number]  Physical Therapist

## 2021-08-11 ENCOUNTER — TREATMENT (OUTPATIENT)
Dept: PHYSICAL THERAPY | Facility: CLINIC | Age: 36
End: 2021-08-11

## 2021-08-11 DIAGNOSIS — R19.8 ABDOMINAL WEAKNESS: ICD-10-CM

## 2021-08-11 DIAGNOSIS — N81.89 PELVIC FLOOR WEAKNESS: ICD-10-CM

## 2021-08-11 DIAGNOSIS — N39.3 STRESS INCONTINENCE: ICD-10-CM

## 2021-08-11 DIAGNOSIS — M62.89 PELVIC FLOOR DYSFUNCTION IN FEMALE: Primary | ICD-10-CM

## 2021-08-11 PROCEDURE — 97110 THERAPEUTIC EXERCISES: CPT | Performed by: PHYSICAL THERAPIST

## 2021-08-11 PROCEDURE — 97112 NEUROMUSCULAR REEDUCATION: CPT | Performed by: PHYSICAL THERAPIST

## 2021-08-11 NOTE — PROGRESS NOTES
Re-Assessment / Re-Certification      Patient: Lazara Sanchez   : 1985  Diagnosis/ICD-10 Code:  Pelvic floor dysfunction in female [M62.89]  Referring practitioner: LORENA Lundy  Date of Initial Visit: Type: THERAPY  Noted: 2021  Today's Date: 2021  Patient seen for 3 sessions      Subjective:   Lazara Sanchez reports: had been sick. Feels like more in control of muscles and able to isolate better. Able to breathe through exercises. Still difficult to relax. Able to do jumping jacks this morning without problems. 70% improvement with bladder. Several weeks ago was sick with coughing and had leakage. Pelvic floor keeping up with, abs not as much.     Pain Rating (0-10): 0      Subjective Questionnaire: PFDI 20  Clinical Progress: improved  Home Program Compliance: Yes  Treatment has included: therapeutic exercise, neuromuscular re-education, manual therapy and therapeutic activity      Objective   verbal consent obtained for internal pelvic exam/treatment with declined need for second person in room    PERF SCALE:  Power: 3+     Endurance: 10     Repetitions: 5     Fast twitch: 6    Assessment/Plan   Pt mostly compliant with HEP. She has met 3/3 short term goals and is making progress toward LTG. She demonstrates significant improvement in strength of PFM and would benefit from more consistent abdominal strengthening. HEP was adjusted to pair abdominal and pelvic floor strengthening today. Pt is appropriate to be seen every 2 weeks at this time.     Progress toward previous goals: Partially Met      Goals:   STG's: 4 weeks  · Patient will report > 25% improvement in urinary symptoms - met  · Patient will be I with pelvic brace for impact exercise - met  · Patient will be able to perform HEP with minimal verbal cues - met     LTG's: By discharge     · Patient will report >75% improvement in urinary symptoms   · Patient will be able to exercise without incontinence for improved  QOL  · Patient will be independent with HEP        Recommendations: Continue as planned  Timeframe: 6 weeks  Prognosis to achieve goals: good    PT Signature: Elier Kim PT      Based upon review of the patient's progress and continued therapy plan, it is my medical opinion that Lazara Sanchez should continue physical therapy treatment at [facility]  [address] [place] [id number] [phone].    Signature: __________________________________  Kathy Bee, APRN    1403/1442  Manual Therapy:    0     mins  74645;  Therapeutic Exercise:    29     mins  45137;     Neuromuscular Martha:    10    mins  88893;    Therapeutic Activity:     0     mins  10194;     Gait Trainin     mins  25031;     Ultrasound:     0     mins  37247;    Electrical Stimulation:    0     mins  89841 ( );  Dry Needling     0     mins self-pay    Timed Treatment:   39   mins   Total Treatment:     39   mins

## 2021-08-24 ENCOUNTER — TREATMENT (OUTPATIENT)
Dept: PHYSICAL THERAPY | Facility: CLINIC | Age: 36
End: 2021-08-24

## 2021-08-24 DIAGNOSIS — M62.89 PELVIC FLOOR DYSFUNCTION IN FEMALE: Primary | ICD-10-CM

## 2021-08-24 DIAGNOSIS — N39.3 STRESS INCONTINENCE: ICD-10-CM

## 2021-08-24 DIAGNOSIS — R19.8 ABDOMINAL WEAKNESS: ICD-10-CM

## 2021-08-24 DIAGNOSIS — N81.89 PELVIC FLOOR WEAKNESS: ICD-10-CM

## 2021-08-24 PROCEDURE — 97112 NEUROMUSCULAR REEDUCATION: CPT | Performed by: PHYSICAL THERAPIST

## 2021-08-24 PROCEDURE — 97110 THERAPEUTIC EXERCISES: CPT | Performed by: PHYSICAL THERAPIST

## 2021-08-24 NOTE — PROGRESS NOTES
Physical Therapy Daily Progress Note  Patient: Lazara Sanchez   : 1985  Diagnosis/ICD-10 Code:  Pelvic floor dysfunction in female [M62.89]  Referring practitioner: LORENA Lundy  Date of Initial Visit: Type: THERAPY  Noted: 2021  Today's Date: 2021  Patient seen for 4 sessions      Subjective   Lazara Sanchez reports doing HEP daily. Not having any leakage with coughing and sneezing. Back in full swing for ab workouts. All HIIT exercise workouts. Not a ton of jumping. Wants to be able to get back to jump rope.   Pain Rating (0-10): 0      Objective   verbal consent obtained for external pelvic exam/treatment with declined need for second person in room     SEMG: supine:  3.2, avg 20.3, max 44.2    See Exercise, Manual, and Modality Logs for complete treatment.       Assessment/Plan  Pt doing well with significant decrease in symptoms. She demonstrates increased recruitment of pelvic floor mm with sEMG today. Her HEP was advanced to include impact to assist with return to exercise without incontinence. Due to progress she is appropriate to be seen in one month for follow up and will likely be discharged at that time.     Anticipate DC next Visit         1516/1555   Timed:         Manual Therapy:    0     mins  52143;     Therapeutic Exercise:    16     mins  93505;     Neuromuscular Martha:    23    mins  49640;    Therapeutic Activity:     0     mins  02938;     Gait Trainin     mins  79724;     Ultrasound:     0     mins  30726;    Ionto                               0    mins   29260  Self Care                       0     mins   86495  Canalith Repos               0    mins  74049    Un-Timed:  Electrical Stimulation:    0     mins  54728 ( );  Dry Needling     0     mins self-pay  Traction     0     mins 14883  Low Eval     0     Mins  03640  Mod Eval     0     Mins  34629  High Eval                       0     Mins  95150  Re-Eval                           0    mins   30504    Timed Treatment:   39   mins   Total Treatment:     39   mins    Elier Kim, PT  [place] License # [id number]  Physical Therapist

## 2021-08-27 NOTE — NURSING NOTE
0555- SVE repeated. Patient lety regularly and becoming increasingly more intense. Cervical change noted. Patient wishes not to start pitocin at this time.    Details (Free Text): Photographs were obtained today. Detail Level: Zone

## 2021-10-06 ENCOUNTER — TREATMENT (OUTPATIENT)
Dept: PHYSICAL THERAPY | Facility: CLINIC | Age: 36
End: 2021-10-06

## 2021-10-06 DIAGNOSIS — R19.8 ABDOMINAL WEAKNESS: ICD-10-CM

## 2021-10-06 DIAGNOSIS — M62.89 PELVIC FLOOR DYSFUNCTION IN FEMALE: Primary | ICD-10-CM

## 2021-10-06 DIAGNOSIS — N81.89 PELVIC FLOOR WEAKNESS: ICD-10-CM

## 2021-10-06 DIAGNOSIS — N39.3 STRESS INCONTINENCE: ICD-10-CM

## 2021-10-06 PROCEDURE — 97110 THERAPEUTIC EXERCISES: CPT | Performed by: PHYSICAL THERAPIST

## 2021-10-06 NOTE — PROGRESS NOTES
Re-Assessment / Discharge Summary      Patient: Lazara Sanchez   : 1985  Diagnosis/ICD-10 Code:  Pelvic floor dysfunction in female [M62.89]  Referring practitioner: LORENA Lundy  Date of Initial Visit: Type: THERAPY  Noted: 2021  Today's Date: 10/8/2021  Patient seen for 5 sessions      Subjective:   Lazara Sanchez reports: doing well. Can jump repeatedly with no issues. Not worried. Exercising 5-6x per week.    Subjective Questionnaire: LEONA 6: 4.2%  Clinical Progress: improved  Home Program Compliance: Yes  Treatment has included: therapeutic exercise, neuromuscular re-education, manual therapy and therapeutic activity      Objective   verbal consent obtained for internal pelvic exam/treatment with declined need for second person in room     PERF SCALE:  Power: 4     Endurance: 10     Repetitions: 8     Fast twitch: 7    See flowsheet for details of treatment following reassessment.      Assessment/Plan   Pt compliant with HEP and symptoms have resolved. She has been able to return to exercise with no issues. Pt has met all short and long term goals and is appropriate for discharge to home program at this time. She was instructed in maintenance HEP today.      Progress toward previous goals: Partially Met        Goals:   STG's: 4 weeks  · Patient will report > 25% improvement in urinary symptoms - met  · Patient will be I with pelvic brace for impact exercise - met  · Patient will be able to perform HEP with minimal verbal cues - met     LTG's: By discharge     · Patient will report >75% improvement in urinary symptoms - met  · Patient will be able to exercise without incontinence for improved QOL - met  · Patient will be independent with HEP - met       Recommendations: Discharge      PT Signature: Elier Kim PT      Based upon review of the patient's progress and continued therapy plan, it is my medical opinion that Lazara Sanchez should continue physical therapy treatment at [facility]  [facility]  [address]  [facility] [address] [phone].    Signature: __________________________________  Kathy Bee, APRN    1110/1140  Manual Therapy:    0     mins  67805;  Therapeutic Exercise:    30     mins  74268;     Neuromuscular Martha:    0    mins  11896;    Therapeutic Activity:     0     mins  65451;     Gait Trainin     mins  62422;     Ultrasound:     0     mins  95560;    Electrical Stimulation:    0     mins  45672 ( );  Dry Needling     0     mins self-pay    Timed Treatment:   30   mins   Total Treatment:     30   mins

## 2021-10-20 ENCOUNTER — TELEPHONE (OUTPATIENT)
Dept: OBSTETRICS AND GYNECOLOGY | Facility: CLINIC | Age: 36
End: 2021-10-20

## 2021-10-20 NOTE — TELEPHONE ENCOUNTER
Pt stated she was referred to a pelvic therapist by Nurse Bee but was told insurance would not cover it due to goals of care not being stated. She wanted to know if someone could reach out to her insurance and inform them that there actually was a need for the referral. The phone number for the insurance is [phone] and she would also like for someone to reach out to her once something is figured out.

## 2022-04-22 ENCOUNTER — TELEPHONE (OUTPATIENT)
Dept: OBSTETRICS AND GYNECOLOGY | Facility: CLINIC | Age: 37
End: 2022-04-22

## 2022-04-22 RX ORDER — LEVONORGESTREL AND ETHINYL ESTRADIOL 0.1-0.02MG
1 KIT ORAL DAILY
Qty: 84 TABLET | Refills: 0 | Status: SHIPPED | OUTPATIENT
Start: 2022-04-22 | End: 2022-05-03 | Stop reason: SDUPTHER

## 2022-04-25 NOTE — TELEPHONE ENCOUNTER
S/w [facility], they stated her birth control was suppost to be sent to them instead of [facility] on 4/22. I gave sandrine a verbal over the phone for one month of birth control w/ no refills and to advise patient to call our office to get her annual appt scheduled. She v/u.

## 2022-04-25 NOTE — TELEPHONE ENCOUNTER
Sandrine from [facility] called wanting to get verbal confirmation for prescription. cb is [phone]

## 2022-05-03 ENCOUNTER — OFFICE VISIT (OUTPATIENT)
Dept: OBSTETRICS AND GYNECOLOGY | Facility: CLINIC | Age: 37
End: 2022-05-03

## 2022-05-03 VITALS
SYSTOLIC BLOOD PRESSURE: 110 MMHG | HEIGHT: 67 IN | DIASTOLIC BLOOD PRESSURE: 60 MMHG | BODY MASS INDEX: 22.91 KG/M2 | WEIGHT: 146 LBS

## 2022-05-03 DIAGNOSIS — Z01.419 WOMEN'S ANNUAL ROUTINE GYNECOLOGICAL EXAMINATION: Primary | ICD-10-CM

## 2022-05-03 DIAGNOSIS — Z30.41 ENCOUNTER FOR SURVEILLANCE OF CONTRACEPTIVE PILLS: ICD-10-CM

## 2022-05-03 PROCEDURE — 99395 PREV VISIT EST AGE 18-39: CPT | Performed by: OBSTETRICS & GYNECOLOGY

## 2022-05-03 RX ORDER — LEVONORGESTREL AND ETHINYL ESTRADIOL 0.1-0.02MG
1 KIT ORAL DAILY
Qty: 84 TABLET | Refills: 3 | Status: SHIPPED | OUTPATIENT
Start: 2022-05-03 | End: 2023-05-03

## 2022-05-03 NOTE — PROGRESS NOTES
GYN Annual Exam     CC - Here for annual exam.     Subjective   HPI  Lazara Sanchez is a 36 y.o. female, , who presents for annual well woman exam. Patient's last menstrual period was 2022 (approximate).  Periods are regular lasting 7 days but will have light spotting for up to 10 days.  She denies  dysmenorrhea. Partner Status: Marital Status: .  New Partners since last visit: no.  Desires STD Screening: no.       She had pelvic floor PT and urinary incontinence has resolved. Baby is now 17 months. Happy on ocps.     Additional OB/GYN History     Current contraception: contraceptive methods: OCP (estrogen/progesterone)  Desires to: continue contraception  Last Pap : neg; HPV neg.   Last Completed Pap Smear          Ordered - PAP SMEAR (Every 3 Years) Ordered on 5/3/2022    2020  Done - Negative              History of abnormal Pap smear: no  Family history of uterine, colon, breast, or ovarian cancer: yes - PGM had ovarian cancer  Previous Mammogram :  no  Performs monthly Self-Breast Exam: no  Exercises Regularly:yes  Feelings of Anxiety or Depression: yes - She plans to reach out to therapist within the next week for more depressive s/s the past 3 months.   Tobacco Usage?: No   OB History        2    Para   2    Term   2       0    AB   0    Living   2       SAB   0    IAB   0    Ectopic   0    Molar   0    Multiple   0    Live Births   2                Health Maintenance   Topic Date Due   • Annual Gynecologic Pelvic and Breast Exam  Never done   • ANNUAL PHYSICAL  Never done   • TDAP/TD VACCINES (1 - Tdap) Never done   • HEPATITIS C SCREENING  Never done   • INFLUENZA VACCINE  2022   • PAP SMEAR  2023   • COVID-19 Vaccine  Completed   • Pneumococcal Vaccine 0-64  Aged Out     [unfilled]      The additional following portions of the patient's history were reviewed and updated as appropriate: allergies, current medications, past family history, past  "medical history, past social history, past surgical history and problem list.    Review of Systems   Constitutional: Negative for fever, unexpected weight gain and unexpected weight loss.   Eyes: Negative for visual disturbance.   Respiratory: Negative for cough and shortness of breath.    Cardiovascular: Negative for chest pain.   Gastrointestinal: Negative for abdominal pain, blood in stool, constipation and diarrhea.   Endocrine: Negative for cold intolerance and heat intolerance.   Genitourinary: Negative for amenorrhea, breast discharge, breast lump, dyspareunia, menstrual problem and vaginal pain.   Musculoskeletal: Negative for back pain and myalgias.   Skin: Negative for dry skin and skin lesions.   Neurological: Negative for headache.   Psychiatric/Behavioral: Negative for suicidal ideas and depressed mood.       I have reviewed and agree with the HPI, ROS, and historical information as entered above. Lisa Belle MD    Objective   /60   Ht 170.2 cm (67\")   Wt 66.2 kg (146 lb)   LMP 04/26/2022 (Approximate)   Breastfeeding No   BMI 22.87 kg/m²     Physical Exam  Vitals and nursing note reviewed. Exam conducted with a chaperone present.   Constitutional:       Appearance: She is well-developed.   HENT:      Head: Normocephalic and atraumatic.   Eyes:      Pupils: Pupils are equal, round, and reactive to light.   Neck:      Thyroid: No thyroid mass or thyromegaly.   Pulmonary:      Effort: Pulmonary effort is normal. No retractions.   Chest:      Chest wall: No mass.   Breasts:      Right: Normal. No mass, nipple discharge, skin change or tenderness.      Left: Normal. No mass, nipple discharge, skin change or tenderness.       Abdominal:      General: Bowel sounds are normal.      Palpations: Abdomen is soft. Abdomen is not rigid. There is no mass.      Tenderness: There is no abdominal tenderness. There is no guarding.      Hernia: No hernia is present. There is no hernia in the left inguinal " area or right inguinal area.   Genitourinary:     Exam position: Lithotomy position.      Pubic Area: No rash.       Labia:         Right: No rash, tenderness or lesion.         Left: No rash, tenderness or lesion.       Urethra: No urethral pain or urethral swelling.      Vagina: Normal. No vaginal discharge or lesions.      Cervix: No cervical motion tenderness, discharge, lesion or cervical bleeding.      Uterus: Normal. Not enlarged, not fixed and not tender.       Adnexa:         Right: No mass, tenderness or fullness.          Left: No mass, tenderness or fullness.        Rectum: No external hemorrhoid.   Musculoskeletal:      Cervical back: Normal range of motion. No muscular tenderness.      Right lower leg: No edema.      Left lower leg: No edema.   Skin:     General: Skin is warm and dry.   Neurological:      Mental Status: She is alert and oriented to person, place, and time.      Motor: Motor function is intact.   Psychiatric:         Mood and Affect: Mood and affect normal.         Behavior: Behavior normal.         Assessment/Plan       Encounter Diagnoses   Name Primary?   • Women's annual routine gynecological examination Yes   • Encounter for surveillance of contraceptive pills        Plan     1. Recommended use of Vitamin D replacement and getting adequate calcium in her diet. (1500mg)  2. Reviewed monthly self breast exams.  Instructed to call with lumps, pain, or breast discharge.    3. Reviewed HPV guidelines.  4. OCP - happy on current pills. She understands the risks of blood clots, stroke, MI and theoretic increased risks of breast cancer. she does not smoke and has no relevant family h/o blood clots.  5. Reviewed exercise as a preventative health measures.       Lisa Belle MD  05/03/2022

## 2022-05-11 DIAGNOSIS — Z30.41 ENCOUNTER FOR SURVEILLANCE OF CONTRACEPTIVE PILLS: ICD-10-CM

## 2022-05-11 DIAGNOSIS — Z01.419 WOMEN'S ANNUAL ROUTINE GYNECOLOGICAL EXAMINATION: ICD-10-CM

## 2022-06-07 ENCOUNTER — TELEPHONE (OUTPATIENT)
Dept: FAMILY MEDICINE CLINIC | Facility: CLINIC | Age: 37
End: 2022-06-07

## 2022-06-07 NOTE — TELEPHONE ENCOUNTER
Caller: Lazara Sanchez    Relationship: Self    Best call back number: [phone]    What medication are you requesting: SERTRALINE    What are your current symptoms: ,LOW MOOD, SADNESS, HOPELESSNESS, ANGER,    How long have you been experiencing symptoms: ON AND OFF SINCE February    Have you had these symptoms before:    [x] Yes  [] No    Have you been treated for these symptoms before:   [x] Yes  [] No    If a prescription is needed, what is your preferred pharmacy and phone number: [address] - [phone] [facility] [phone]      Additional notes:

## 2022-06-14 NOTE — TELEPHONE ENCOUNTER
Pt called to check status of refill request. I informed pt that she has not been seen here in 3 years and would need to schedule an appt with PCP to discuss & since it has been 4 years she would need to schedule a new pt appt. Pt verbalized understanding and did not have any additional questions at this time. Pt is scheduled for a visit next week.

## 2022-06-22 ENCOUNTER — PATIENT ROUNDING (BHMG ONLY) (OUTPATIENT)
Dept: FAMILY MEDICINE CLINIC | Facility: CLINIC | Age: 37
End: 2022-06-22

## 2022-06-22 ENCOUNTER — OFFICE VISIT (OUTPATIENT)
Dept: FAMILY MEDICINE CLINIC | Facility: CLINIC | Age: 37
End: 2022-06-22

## 2022-06-22 VITALS
DIASTOLIC BLOOD PRESSURE: 72 MMHG | HEIGHT: 67 IN | SYSTOLIC BLOOD PRESSURE: 110 MMHG | BODY MASS INDEX: 22.76 KG/M2 | HEART RATE: 64 BPM | TEMPERATURE: 97.6 F | OXYGEN SATURATION: 100 % | WEIGHT: 145 LBS

## 2022-06-22 DIAGNOSIS — F33.2 SEVERE EPISODE OF RECURRENT MAJOR DEPRESSIVE DISORDER, WITHOUT PSYCHOTIC FEATURES: Primary | ICD-10-CM

## 2022-06-22 PROBLEM — F33.1 MAJOR DEPRESSIVE DISORDER, RECURRENT EPISODE, MODERATE DEGREE (HCC): Status: ACTIVE | Noted: 2022-06-22

## 2022-06-22 PROBLEM — F33.1 MAJOR DEPRESSIVE DISORDER, RECURRENT EPISODE, MODERATE DEGREE: Status: RESOLVED | Noted: 2022-06-22 | Resolved: 2022-06-22

## 2022-06-22 PROCEDURE — 99214 OFFICE O/P EST MOD 30 MIN: CPT | Performed by: FAMILY MEDICINE

## 2022-06-22 RX ORDER — SERTRALINE HYDROCHLORIDE 25 MG/1
25 TABLET, FILM COATED ORAL DAILY
Qty: 30 TABLET | Refills: 3 | Status: SHIPPED | OUTPATIENT
Start: 2022-06-22 | End: 2022-07-29 | Stop reason: SDUPTHER

## 2022-06-22 NOTE — PROGRESS NOTES
Chief Complaint  Establish Care and Depression (Pt would like to discuss starting Sertraline again. )    Subjective        Lazara Sanchez presents to [facility]  Depression  Visit Type: initial  Patient presents with the following symptoms: anhedonia, decreased concentration, depressed mood, fatigue, palpitations and shortness of breath.  Patient is not experiencing: weight gain and weight loss.  Patient has a history of: depression  Treatment tried: SSRI               PHQ-2/PHQ-9 Depression Screening 6/22/2022   Retired Total Score -   Little Interest or Pleasure in Doing Things 2-->more than half the days   Feeling Down, Depressed or Hopeless 2-->more than half the days   Trouble Falling or Staying Asleep, or Sleeping Too Much 1-->several days   Feeling Tired or Having Little Energy 3-->nearly every day   Poor Appetite or Overeating 1-->several days   Feeling Bad about Yourself - or that You are a Failure or Have Let Yourself or Your Family Down 2-->more than half the days   Trouble Concentrating on Things, Such as Reading the Newspaper or Watching Television 2-->more than half the days   Moving or Speaking So Slowly that Other People Could Have Noticed? Or the Opposite - Being So Fidgety 1-->several days   Thoughts that You Would be Better Off Dead or of Hurting Yourself in Some Way 1-->several days   PHQ-9: Brief Depression Severity Measure Score 15   If You Checked Off Any Problems, How Difficult Have These Problems Made It For You to Do Your Work, Take Care of Things at Home, or Get Along with Other People? somewhat difficult         Depression worse since February 2022. Returned to work in theater. Stress at work and increased responsibilities. She started a new job. [employer] and [employer] in [facility]. Family stress as well. She is more withdrawn and she doesn't go out as much. She doesn't enjoy social gatherings as much. She gained 8 lbs between November  "and March. Emotional eating. Very recently she has got back into nutrition. She has meditation jose r. Plans to exercise more frequently. She has had cycles of depression off and on. This episode is more acute. Passive suicidal thoughts and no plan.       Review of Systems   Constitutional: Positive for fatigue. Negative for unexpected weight gain and unexpected weight loss.   Respiratory: Positive for shortness of breath.    Cardiovascular: Positive for palpitations. Negative for chest pain.   Gastrointestinal: Negative for nausea and GERD.   Neurological: Positive for headache.   Psychiatric/Behavioral: Positive for decreased concentration, depressed mood and stress. Negative for sleep disturbance.     Objective   Vital Signs:  /72   Pulse 64   Temp 97.6 °F (36.4 °C)   Ht 170.2 cm (67\")   Wt 65.8 kg (145 lb)   SpO2 100%   BMI 22.71 kg/m²   Estimated body mass index is 22.71 kg/m² as calculated from the following:    Height as of this encounter: 170.2 cm (67\").    Weight as of this encounter: 65.8 kg (145 lb).    BMI is within normal parameters. No other follow-up for BMI required.      Physical Exam  Vitals reviewed.   Constitutional:       General: She is not in acute distress.     Appearance: She is not ill-appearing.   Cardiovascular:      Rate and Rhythm: Normal rate and regular rhythm.   Pulmonary:      Effort: Pulmonary effort is normal.      Breath sounds: Normal breath sounds.   Neurological:      Mental Status: She is alert.   Psychiatric:         Attention and Perception: Attention and perception normal.         Mood and Affect: Mood is depressed. Affect is tearful.         Behavior: Behavior normal. Behavior is cooperative.         Thought Content: Thought content does not include homicidal plan.         Cognition and Memory: Cognition normal.         Judgment: Judgment normal.        Result Review :                Assessment and Plan   Diagnoses and all orders for this visit:    1. Severe " episode of recurrent major depressive disorder, without psychotic features (HCC) (Primary)  -     sertraline (ZOLOFT) 25 MG tablet; Take 1 tablet by mouth Daily.  Dispense: 30 tablet; Refill: 3    Patient previously took Zoloft and did well without side effects.  At this time start Zoloft 25 mg and reassess in 1 month.         Follow Up   Return in about 1 month (around 7/22/2022) for Office or MyChart Video Visit depression .  Patient was given instructions and counseling regarding her condition or for health maintenance advice. Please see specific information pulled into the AVS if appropriate.     Electronically signed by Caroline Charlton MD, 06/22/22, 4:24 PM EDT.

## 2022-07-29 ENCOUNTER — TELEMEDICINE (OUTPATIENT)
Dept: FAMILY MEDICINE CLINIC | Facility: CLINIC | Age: 37
End: 2022-07-29

## 2022-07-29 DIAGNOSIS — F33.42 MAJOR DEPRESSIVE DISORDER, RECURRENT, IN FULL REMISSION: Primary | ICD-10-CM

## 2022-07-29 PROCEDURE — 99213 OFFICE O/P EST LOW 20 MIN: CPT | Performed by: FAMILY MEDICINE

## 2022-07-29 RX ORDER — SERTRALINE HYDROCHLORIDE 25 MG/1
25 TABLET, FILM COATED ORAL DAILY
Qty: 90 TABLET | Refills: 1 | Status: SHIPPED | OUTPATIENT
Start: 2022-07-29 | End: 2022-10-28

## 2022-07-29 NOTE — PROGRESS NOTES
Chief Complaint  Depression    Subjective         Lazara Sanchez presents to [facility]  Depression  Visit Type: follow-up  Patient is not experiencing: anhedonia, depressed mood, insomnia, irritability, weight gain and weight loss.       Mood is much better. Weepiness is gone. Less labile, more even and less irritable. No sadness. Sexual side effects with difficulty orgasm initially and now improving.  She is taking Zoloft in the morning.        Objective   Vital Signs:   There were no vitals taken for this visit.    Physical Exam   Constitutional: She does not appear ill. No distress.   Pulmonary/Chest: Effort normal.  No respiratory distress.  Neurological: She is alert.   Psychiatric: She has a normal mood and affect. Her speech is normal and behavior is normal. Judgment and thought content normal.     Result Review :                 Assessment and Plan    Diagnoses and all orders for this visit:    1. Major depressive disorder, recurrent, in full remission (HCC) (Primary)  -     sertraline (ZOLOFT) 25 MG tablet; Take 1 tablet by mouth Daily.  Dispense: 90 tablet; Refill: 1    Improved.  Continue Zoloft.  Discussed could try switching to nighttime dosing if that will reduce sexual side effects.  If sexual dysfunction continues follow-up and may consider changing medications.  Reassess mood in 6 months at physical appointment.      Follow Up   Return in about 6 months (around 1/29/2023) for Physical and depression .  Patient was given instructions and counseling regarding her condition or for health maintenance advice. Please see specific information pulled into the AVS if appropriate.     Mode of Visit: Video  Location of patient: home   Location of provider: [place]  You have chosen to receive care through a telehealth visit.  The patient has signed the video visit consent form.  The visit included audio and video interaction. No technical issues occurred during this visit.      Electronically signed by Caroline Charlton MD, 07/29/22, 7:40 AM EDT.

## 2022-10-28 ENCOUNTER — OFFICE VISIT (OUTPATIENT)
Dept: FAMILY MEDICINE CLINIC | Facility: CLINIC | Age: 37
End: 2022-10-28

## 2022-10-28 VITALS
SYSTOLIC BLOOD PRESSURE: 112 MMHG | WEIGHT: 148 LBS | OXYGEN SATURATION: 99 % | DIASTOLIC BLOOD PRESSURE: 68 MMHG | HEART RATE: 78 BPM | BODY MASS INDEX: 23.23 KG/M2 | HEIGHT: 67 IN

## 2022-10-28 DIAGNOSIS — R19.7 DIARRHEA, UNSPECIFIED TYPE: ICD-10-CM

## 2022-10-28 DIAGNOSIS — Z00.00 WELL ADULT EXAM: Primary | ICD-10-CM

## 2022-10-28 DIAGNOSIS — F33.42 MAJOR DEPRESSIVE DISORDER, RECURRENT, IN FULL REMISSION: ICD-10-CM

## 2022-10-28 DIAGNOSIS — Z23 NEED FOR VACCINATION: ICD-10-CM

## 2022-10-28 PROCEDURE — 91312 COVID-19 (PFIZER) BIVALENT BOOSTER 12+YRS: CPT | Performed by: FAMILY MEDICINE

## 2022-10-28 PROCEDURE — 99395 PREV VISIT EST AGE 18-39: CPT | Performed by: FAMILY MEDICINE

## 2022-10-28 PROCEDURE — 0124A PR ADM SARSCOV2 30MCG/0.3ML BST: CPT | Performed by: FAMILY MEDICINE

## 2022-10-28 RX ORDER — BUPROPION HYDROCHLORIDE 150 MG/1
150 TABLET ORAL DAILY
Qty: 90 TABLET | Refills: 3 | Status: SHIPPED | OUTPATIENT
Start: 2022-10-28

## 2022-10-28 NOTE — PROGRESS NOTES
"Chief Complaint  well adult exam and Annual Exam    Subjective          Lazara Sanchez presents to [facility] for   History of Present Illness     She had Tdap during pregnancy.     Mood is greatly improved. She has thought about changing to wellbutrin due to sexual and GI side effects loose stools.     Periods are different. Starting earlier on pill pack, this time it was at week 1.5 and done before her placebo pills. New cramping.     FH thyroid problems.     She changed diet for detox and no grains for 10 days. Intolerance to pizza around 6 months ago. Bread related foods with stomach pain and gas.         Objective   Vital Signs:   Vitals:    10/28/22 0928   BP: 112/68   Pulse: 78   SpO2: 99%   Weight: 67.1 kg (148 lb)   Height: 170.2 cm (67.01\")     Body mass index is 23.17 kg/m².    BMI is within normal parameters. No other follow-up for BMI required.          Physical Exam  Constitutional:       General: She is not in acute distress.  HENT:      Right Ear: Tympanic membrane and ear canal normal.      Left Ear: Tympanic membrane and ear canal normal.   Eyes:      General:         Right eye: No discharge.         Left eye: No discharge.      Conjunctiva/sclera: Conjunctivae normal.   Neck:      Thyroid: No thyromegaly.   Cardiovascular:      Rate and Rhythm: Normal rate and regular rhythm.   Pulmonary:      Effort: Pulmonary effort is normal.      Breath sounds: Normal breath sounds.   Abdominal:      Palpations: Abdomen is soft. There is no hepatomegaly.      Tenderness: There is no abdominal tenderness.   Musculoskeletal:      Cervical back: Neck supple.   Lymphadenopathy:      Head:      Right side of head: No submandibular, preauricular or posterior auricular adenopathy.      Left side of head: No submandibular, preauricular or posterior auricular adenopathy.      Cervical: No cervical adenopathy.   Skin:     General: Skin is warm.   Neurological:      Mental Status: She is " alert and oriented to person, place, and time.   Psychiatric:         Mood and Affect: Mood normal.         Behavior: Behavior normal.         Thought Content: Thought content normal.         Judgment: Judgment normal.        Result Review :                Immunization History   Administered Date(s) Administered   • COVID-19 (PFIZER) BIVALENT BOOSTER 12+YRS 10/28/2022   • COVID-19 (PFIZER) PURPLE CAP 01/22/2021, 02/12/2021, 10/08/2021   • Flu Vaccine Quad PF 6-35MO 10/06/2022   • FluLaval/Fluzone >6mos 10/08/2019, 10/02/2020, 10/01/2021   • Influenza Whole 10/02/2018       Health Maintenance   Topic Date Due   • TDAP/TD VACCINES (1 - Tdap) Never done   • PAP SMEAR  05/03/2025   • INFLUENZA VACCINE  Completed            Assessment and Plan    Diagnoses and all orders for this visit:    1. Well adult exam (Primary)  -     CBC (No Diff); Future  -     Comprehensive Metabolic Panel; Future  -     TSH Rfx On Abnormal To Free T4; Future  -     Lipid Panel; Future  Return when fasting to check labs.  Continue well woman care and recommend following up with OB/GYN if menstrual irregularities continue.  Cervical cancer screening up-to-date.  Discussed considering mammography at age 40 with a second-degree relative of breast cancer.  2. Need for vaccination  -     COVID-19 Bivalent Booster (Pfizer) 12+yrs  Obtain Tdap records from OB/GYN.  3. Major depressive disorder, recurrent, in full remission (HCC)  -     buPROPion XL (WELLBUTRIN XL) 150 MG 24 hr tablet; Take 1 tablet by mouth Daily.  Dispense: 90 tablet; Refill: 3  Side effect of sexual dysfunction and diarrhea.  Stop SSRI.  Start bupropion.  4. Diarrhea, unspecified type  -     Celiac Disease Panel; Future  Recommend restarting gluten for at least 1 week prior to having labs collected.      Counseling/anticipatory guidance: mental health,  immunizations, screenings      Follow Up   Return in about 1 year (around 10/28/2023) for Physical and fasting labs.  Patient was  given instructions and counseling regarding her condition or for health maintenance advice. Please see specific information pulled into the AVS if appropriate.      Electronically signed by Caroline Alicea MD, 10/28/22, 10:32 AM EDT.

## 2022-11-04 ENCOUNTER — LAB (OUTPATIENT)
Dept: LAB | Facility: HOSPITAL | Age: 37
End: 2022-11-04

## 2022-11-04 DIAGNOSIS — Z00.00 WELL ADULT EXAM: ICD-10-CM

## 2022-11-04 DIAGNOSIS — R19.7 DIARRHEA, UNSPECIFIED TYPE: ICD-10-CM

## 2022-11-04 LAB
ALBUMIN SERPL-MCNC: 4.2 G/DL (ref 3.5–5.2)
ALBUMIN/GLOB SERPL: 1.4 G/DL
ALP SERPL-CCNC: 33 U/L (ref 39–117)
ALT SERPL W P-5'-P-CCNC: 15 U/L (ref 1–33)
ANION GAP SERPL CALCULATED.3IONS-SCNC: 9.4 MMOL/L (ref 5–15)
AST SERPL-CCNC: 17 U/L (ref 1–32)
BILIRUB SERPL-MCNC: 0.4 MG/DL (ref 0–1.2)
BUN SERPL-MCNC: 10 MG/DL (ref 6–20)
BUN/CREAT SERPL: 13.3 (ref 7–25)
CALCIUM SPEC-SCNC: 9.5 MG/DL (ref 8.6–10.5)
CHLORIDE SERPL-SCNC: 105 MMOL/L (ref 98–107)
CHOLEST SERPL-MCNC: 127 MG/DL (ref 0–200)
CO2 SERPL-SCNC: 25.6 MMOL/L (ref 22–29)
CREAT SERPL-MCNC: 0.75 MG/DL (ref 0.57–1)
DEPRECATED RDW RBC AUTO: 42.4 FL (ref 37–54)
EGFRCR SERPLBLD CKD-EPI 2021: 105.3 ML/MIN/1.73
ERYTHROCYTE [DISTWIDTH] IN BLOOD BY AUTOMATED COUNT: 12.5 % (ref 12.3–15.4)
GLOBULIN UR ELPH-MCNC: 3 GM/DL
GLUCOSE SERPL-MCNC: 83 MG/DL (ref 65–99)
HCT VFR BLD AUTO: 39 % (ref 34–46.6)
HDLC SERPL-MCNC: 57 MG/DL (ref 40–60)
HGB BLD-MCNC: 12.4 G/DL (ref 12–15.9)
LDLC SERPL CALC-MCNC: 54 MG/DL (ref 0–100)
LDLC/HDLC SERPL: 0.95 {RATIO}
MCH RBC QN AUTO: 30 PG (ref 26.6–33)
MCHC RBC AUTO-ENTMCNC: 31.8 G/DL (ref 31.5–35.7)
MCV RBC AUTO: 94.2 FL (ref 79–97)
PLATELET # BLD AUTO: 240 10*3/MM3 (ref 140–450)
PMV BLD AUTO: 10.7 FL (ref 6–12)
POTASSIUM SERPL-SCNC: 4.5 MMOL/L (ref 3.5–5.2)
PROT SERPL-MCNC: 7.2 G/DL (ref 6–8.5)
RBC # BLD AUTO: 4.14 10*6/MM3 (ref 3.77–5.28)
SODIUM SERPL-SCNC: 140 MMOL/L (ref 136–145)
TRIGL SERPL-MCNC: 80 MG/DL (ref 0–150)
TSH SERPL DL<=0.05 MIU/L-ACNC: 2.19 UIU/ML (ref 0.27–4.2)
VLDLC SERPL-MCNC: 16 MG/DL (ref 5–40)
WBC NRBC COR # BLD: 6.91 10*3/MM3 (ref 3.4–10.8)

## 2022-11-04 PROCEDURE — 80061 LIPID PANEL: CPT

## 2022-11-04 PROCEDURE — 80053 COMPREHEN METABOLIC PANEL: CPT

## 2022-11-04 PROCEDURE — 85027 COMPLETE CBC AUTOMATED: CPT

## 2022-11-04 PROCEDURE — 86231 EMA EACH IG CLASS: CPT

## 2022-11-04 PROCEDURE — 82784 ASSAY IGA/IGD/IGG/IGM EACH: CPT

## 2022-11-04 PROCEDURE — 86364 TISS TRNSGLTMNASE EA IG CLAS: CPT

## 2022-11-04 PROCEDURE — 84443 ASSAY THYROID STIM HORMONE: CPT

## 2022-11-07 LAB
ENDOMYSIUM IGA SER QL: NEGATIVE
IGA SERPL-MCNC: 155 MG/DL (ref 87–352)
TTG IGA SER-ACNC: <2 U/ML (ref 0–3)

## 2022-12-19 ENCOUNTER — OFFICE VISIT (OUTPATIENT)
Dept: FAMILY MEDICINE CLINIC | Facility: CLINIC | Age: 37
End: 2022-12-19

## 2022-12-19 VITALS
WEIGHT: 150 LBS | HEART RATE: 76 BPM | SYSTOLIC BLOOD PRESSURE: 110 MMHG | OXYGEN SATURATION: 99 % | TEMPERATURE: 98.2 F | RESPIRATION RATE: 18 BRPM | HEIGHT: 67 IN | BODY MASS INDEX: 23.54 KG/M2 | DIASTOLIC BLOOD PRESSURE: 70 MMHG

## 2022-12-19 DIAGNOSIS — H65.193 ACUTE MIDDLE EAR EFFUSION, BILATERAL: ICD-10-CM

## 2022-12-19 DIAGNOSIS — R05.2 SUBACUTE COUGH: ICD-10-CM

## 2022-12-19 DIAGNOSIS — J02.9 SORE THROAT: Primary | ICD-10-CM

## 2022-12-19 DIAGNOSIS — R09.81 NASAL CONGESTION: ICD-10-CM

## 2022-12-19 LAB
EXPIRATION DATE: NORMAL
EXPIRATION DATE: NORMAL
FLUAV AG UPPER RESP QL IA.RAPID: NOT DETECTED
FLUBV AG UPPER RESP QL IA.RAPID: NOT DETECTED
INTERNAL CONTROL: NORMAL
INTERNAL CONTROL: NORMAL
Lab: NORMAL
Lab: NORMAL
S PYO AG THROAT QL: NEGATIVE
SARS-COV-2 AG UPPER RESP QL IA.RAPID: NOT DETECTED

## 2022-12-19 PROCEDURE — 87880 STREP A ASSAY W/OPTIC: CPT | Performed by: NURSE PRACTITIONER

## 2022-12-19 PROCEDURE — 87428 SARSCOV & INF VIR A&B AG IA: CPT | Performed by: NURSE PRACTITIONER

## 2022-12-19 PROCEDURE — 99214 OFFICE O/P EST MOD 30 MIN: CPT | Performed by: NURSE PRACTITIONER

## 2022-12-19 RX ORDER — AMOXICILLIN AND CLAVULANATE POTASSIUM 875; 125 MG/1; MG/1
1 TABLET, FILM COATED ORAL 2 TIMES DAILY
Qty: 14 TABLET | Refills: 0 | Status: SHIPPED | OUTPATIENT
Start: 2022-12-19 | End: 2022-12-26

## 2022-12-19 RX ORDER — FLUTICASONE PROPIONATE 50 MCG
2 SPRAY, SUSPENSION (ML) NASAL DAILY
Qty: 16 G | Refills: 5 | Status: SHIPPED | OUTPATIENT
Start: 2022-12-19 | End: 2023-03-15

## 2022-12-19 RX ORDER — PREDNISONE 20 MG/1
20 TABLET ORAL DAILY
Qty: 5 TABLET | Refills: 0 | Status: SHIPPED | OUTPATIENT
Start: 2022-12-19 | End: 2023-03-15

## 2022-12-19 NOTE — PATIENT INSTRUCTIONS
Take claritin twice a day for one week, then back to antihistamine once a day   Flonase nasal spray  Salt water gargles; hot tea with honey and lemon

## 2022-12-19 NOTE — PROGRESS NOTES
Subjective   Lazara Sanchez is a 37 y.o. female.   Chief Complaint   Patient presents with   • Cough     Pt states been going on for a month   • Sore Throat   • Nasal Congestion   • Earache         History of Present Illness   Patient of Dr. Alicea here with complaint of cough, ear fullness, sore throat and congestion x 1 month.  Has young children at home who have been sick on and off sicne September and illnesses travel through the household.   has tried dayquil, tylenol. Takes allegra daily. No noticeable improvement.  The following portions of the patient's history were reviewed and updated as appropriate: allergies, current medications, past family history, past medical history, past social history, past surgical history and problem list.    Review of Systems   Constitutional: Positive for fatigue. Negative for chills and fever.   HENT: Positive for congestion, ear pain (both ears feel stopped up), postnasal drip, rhinorrhea (yellow drg) and sore throat.    Eyes: Negative for discharge and itching.   Respiratory: Positive for cough. Negative for shortness of breath.    Gastrointestinal: Positive for diarrhea (loose stool yesterday). Negative for nausea.   Neurological: Positive for headaches. Negative for dizziness and light-headedness.       Objective   Physical Exam  Vitals reviewed.   Constitutional:       General: She is not in acute distress.     Appearance: Normal appearance. She is ill-appearing. She is not toxic-appearing or diaphoretic.   HENT:      Right Ear: A middle ear effusion is present. There is no impacted cerumen. Tympanic membrane is bulging. Tympanic membrane is not erythematous.      Left Ear: A middle ear effusion is present. There is no impacted cerumen. Tympanic membrane is bulging. Tympanic membrane is not erythematous.      Ears:      Comments: Opaque TMS     Nose: Rhinorrhea present.      Right Sinus: No maxillary sinus tenderness or frontal sinus tenderness.      Left Sinus: No  "maxillary sinus tenderness or frontal sinus tenderness.      Mouth/Throat:      Pharynx: Posterior oropharyngeal erythema present.      Comments: Cobblestoning posterior pharynx  Neck:      Vascular: No carotid bruit.   Cardiovascular:      Rate and Rhythm: Normal rate and regular rhythm.   Pulmonary:      Effort: Pulmonary effort is normal. No respiratory distress.      Breath sounds: Normal breath sounds. No wheezing or rhonchi.   Lymphadenopathy:      Cervical: No cervical adenopathy.   Skin:     General: Skin is warm and dry.   Neurological:      Mental Status: She is alert.       Results for orders placed or performed in visit on 12/19/22   POCT SARS-CoV-2 Antigen CHERIE + Flu    Specimen: Swab   Result Value Ref Range    SARS Antigen Not Detected Not Detected, Presumptive Negative    Influenza A Antigen CHERIE Not Detected Not Detected    Influenza B Antigen CHERIE Not Detected Not Detected    Internal Control Passed Passed    Lot Number [id number]     Expiration Date 11-9-23    POCT rapid strep A    Specimen: Swab   Result Value Ref Range    Rapid Strep A Screen Negative Negative, VALID, INVALID, Not Performed    Internal Control Passed Passed    Lot Number [id number]     Expiration Date 5-31-24        /70 (BP Location: Left arm, Patient Position: Sitting, Cuff Size: Adult)   Pulse 76   Temp 98.2 °F (36.8 °C) (Temporal)   Resp 18   Ht 170.2 cm (67.01\")   Wt 68 kg (150 lb)   SpO2 99%   BMI 23.49 kg/m²     Assessment & Plan   Diagnoses and all orders for this visit:    1. Sore throat (Primary)  -     POCT SARS-CoV-2 Antigen CHERIE + Flu  -     POCT rapid strep A  -     amoxicillin-clavulanate (Augmentin) 875-125 MG per tablet; Take 1 tablet by mouth 2 (Two) Times a Day for 7 days.  Dispense: 14 tablet; Refill: 0    2. Subacute cough  -     POCT SARS-CoV-2 Antigen CHERIE + Flu  -     POCT rapid strep A    3. Nasal congestion  -     predniSONE (DELTASONE) 20 MG tablet; Take 1 tablet by mouth Daily.  Dispense: 5 " tablet; Refill: 0  -     amoxicillin-clavulanate (Augmentin) 875-125 MG per tablet; Take 1 tablet by mouth 2 (Two) Times a Day for 7 days.  Dispense: 14 tablet; Refill: 0  -     fluticasone (Flonase) 50 MCG/ACT nasal spray; 2 sprays into the nostril(s) as directed by provider Daily.  Dispense: 16 g; Refill: 5    4. Acute middle ear effusion, bilateral  -     predniSONE (DELTASONE) 20 MG tablet; Take 1 tablet by mouth Daily.  Dispense: 5 tablet; Refill: 0  -     amoxicillin-clavulanate (Augmentin) 875-125 MG per tablet; Take 1 tablet by mouth 2 (Two) Times a Day for 7 days.  Dispense: 14 tablet; Refill: 0      Testing negative in office today for flu, covid, and strep.  Prednisone prescribed to reduce inflammation and congestion  Augmentin prescribed since ongoing symptoms x 1 month  Take claritin twice a day for one week, then back to antihistamine once a day   Flonase nasal spray  Salt water gargles; hot tea with honey and lemon  Patient was encouraged to keep me informed of any acute changes, lack of improvement, or any new concerning symptoms.

## 2023-03-07 ENCOUNTER — TELEPHONE (OUTPATIENT)
Dept: OBSTETRICS AND GYNECOLOGY | Facility: CLINIC | Age: 38
End: 2023-03-07
Payer: COMMERCIAL

## 2023-03-07 NOTE — TELEPHONE ENCOUNTER
"lvom for pt to CB    If patient calls back: No, she \"should\" not need an U/S with negative UPTs. Dr. Belle may just have her do blood work.  "

## 2023-03-07 NOTE — TELEPHONE ENCOUNTER
Per [facility] agent patient is 8 days late for cycle and has had 4 negative UPTS is scheduled to come in 03/15 to be seen wondering if she will need an ultrasound

## 2023-03-15 ENCOUNTER — OFFICE VISIT (OUTPATIENT)
Dept: OBSTETRICS AND GYNECOLOGY | Facility: CLINIC | Age: 38
End: 2023-03-15
Payer: COMMERCIAL

## 2023-03-15 VITALS
HEIGHT: 67 IN | BODY MASS INDEX: 22.91 KG/M2 | DIASTOLIC BLOOD PRESSURE: 70 MMHG | WEIGHT: 146 LBS | SYSTOLIC BLOOD PRESSURE: 120 MMHG

## 2023-03-15 DIAGNOSIS — N92.6 MISSED PERIOD: Primary | ICD-10-CM

## 2023-03-15 PROCEDURE — 99213 OFFICE O/P EST LOW 20 MIN: CPT | Performed by: NURSE PRACTITIONER

## 2023-03-15 NOTE — PROGRESS NOTES
Chief Complaint   Patient presents with   • Follow-up     Amenorrhea         Subjective   HPI  Lazara Sanchez is a 37 y.o. female, , her last LMP was No LMP recorded. (Menstrual status: Oral contraceptives). She presents today for amenorrhea.  She reports that she has always had a period each month that occur every 3-4 weeks except for this past month. She states that she went 7 weeks without a period. She did finally start a period on Monday 3/13, day 11 of her pill pack. The flow is normal. Denies dysmenorrhea. Denies missing pills. She had taken 5 pregnancy tests that were all negative.  Patient had light spotting on day 3 last month when she was due for period. Patient has been on OCP since age of 14 besides pregnancy. She is concerned that she may be going through early stages of menopause and would like to discuss. She also reports night sweats, mood swings, irritability, and libido swings. She reports that symptoms have been going on for the past three months.      US was not done today.       Additional OB/GYN History   Last Pap :   Last Completed Pap Smear          Ordered - PAP SMEAR (Every 3 Years) Ordered on 2022  SCANNED - PAP SMEAR    2020  Done - Negative              History of abnormal Pap smear: no  Tobacco Usage?: No       Current Outpatient Medications:   •  acetaminophen (TYLENOL) 500 MG tablet, Take 2 tablets by mouth Every 6 (Six) Hours As Needed for Mild Pain., Disp: , Rfl:   •  buPROPion XL (WELLBUTRIN XL) 150 MG 24 hr tablet, Take 1 tablet by mouth Daily., Disp: 90 tablet, Rfl: 3  •  fexofenadine (ALLEGRA) 180 MG tablet, Take 1 tablet by mouth Daily., Disp: , Rfl:   •  levonorgestrel-ethinyl estradiol (AVIANE,ALESSE,LESSINA) 0.1-20 MG-MCG per tablet, Take 1 tablet by mouth Daily., Disp: 84 tablet, Rfl: 3  •  Melatonin 1 MG/4ML liquid, Take 1 mL by mouth As Needed., Disp: , Rfl:      Past Medical History:   Diagnosis Date   • Acid reflux    •  "Allergic 2005    Seasonal   • Anemia     iron   • Anxiety    • Depression     since 2006   • Eczema    • Exercise induced bronchospasm    • Heartburn 2008    Takes pepcid everyday for 3 years   • History of kidney stones 2017   • History of MRSA infection    • HPV (human papilloma virus) infection     genital warts removed 2006   • Kidney stone    • Migraine    • Postpartum depression     with G1   • Scoliosis     As a kid   • Seasonal allergies    • Varicella 1993        Past Surgical History:   Procedure Laterality Date   • APPENDECTOMY  08/16/2018   • SESAMOIDECTOMY FIRST TOE Left 2010   • WISDOM TOOTH EXTRACTION  2004       The additional following portions of the patient's history were reviewed and updated as appropriate: allergies, current medications, past family history, past medical history, past social history, past surgical history and problem list.    Review of Systems   Constitutional: Negative.    HENT: Negative.    Eyes: Negative.    Respiratory: Negative.    Cardiovascular: Negative.    Gastrointestinal: Negative.    Genitourinary: Positive for menstrual problem ( skipped period).   Musculoskeletal: Negative.    Skin: Negative.    Allergic/Immunologic: Negative.    Neurological: Negative.    Hematological: Negative.    Psychiatric/Behavioral: Negative.         Mood swings  Irritability        I have reviewed and agree with the HPI, ROS, and historical information as entered above. Roby Aguilar, APRN    Objective   /70   Ht 170.2 cm (67.01\")   Wt 66.2 kg (146 lb)   BMI 22.86 kg/m²     Physical Exam  Vitals and nursing note reviewed.   Constitutional:       Appearance: Normal appearance. She is well-developed and normal weight.   HENT:      Head: Normocephalic and atraumatic.   Cardiovascular:      Rate and Rhythm: Normal rate.   Pulmonary:      Effort: Pulmonary effort is normal.   Abdominal:      Palpations: Abdomen is not rigid.   Musculoskeletal:      Cervical back: Normal range " of motion.   Neurological:      Mental Status: She is alert and oriented to person, place, and time.   Psychiatric:         Behavior: Behavior normal.         Assessment & Plan     Assessment     Problem List Items Addressed This Visit    None  Visit Diagnoses     Missed period    -  Primary            Plan     1. Lazara has been on her current ocp for many years without any complications. Last cycle she skipped a period and now has started a bleed mid cycle which is a normal flow amount. This is the first time this has happened. All at home UPT's were negative. Discussed that this is something that can occur on ocp's and it is not harmful. Most of the time within a few cycles the pattern is corrected. If it keeps happening we could discuss changing her pills. She will continue to take pills as usual and monitor this for the next few months.   2. Menopausal transition reviewed. She does not have a familial hx of early menopause. It is possible to start experiencing perimenopausal symptoms many years prior to actual cessation of menses. Often changes in period timing and flow can be initial findings. Discussed that getting hormone labs would not be helpful while she is on ocp's. She v/u and will continue to monitor symptoms.  3. FU prn and annually      Roby Aguilar, APRN  03/15/2023

## 2023-06-15 ENCOUNTER — TELEPHONE (OUTPATIENT)
Dept: OBSTETRICS AND GYNECOLOGY | Facility: CLINIC | Age: 38
End: 2023-06-15
Payer: COMMERCIAL

## 2023-06-15 DIAGNOSIS — Z01.419 WOMEN'S ANNUAL ROUTINE GYNECOLOGICAL EXAMINATION: ICD-10-CM

## 2023-06-15 DIAGNOSIS — Z30.41 ENCOUNTER FOR SURVEILLANCE OF CONTRACEPTIVE PILLS: Primary | ICD-10-CM

## 2023-06-15 RX ORDER — LEVONORGESTREL AND ETHINYL ESTRADIOL 0.1-0.02MG
1 KIT ORAL DAILY
Qty: 84 TABLET | Refills: 0 | Status: SHIPPED | OUTPATIENT
Start: 2023-06-15 | End: 2024-06-14

## 2023-07-24 DIAGNOSIS — F33.42 MAJOR DEPRESSIVE DISORDER, RECURRENT, IN FULL REMISSION: ICD-10-CM

## 2023-07-24 RX ORDER — BUPROPION HYDROCHLORIDE 150 MG/1
TABLET ORAL
Qty: 90 TABLET | Refills: 1 | Status: SHIPPED | OUTPATIENT
Start: 2023-07-24

## 2023-07-24 NOTE — TELEPHONE ENCOUNTER
Rx Refill Note  Requested Prescriptions     Pending Prescriptions Disp Refills    buPROPion XL (WELLBUTRIN XL) 150 MG 24 hr tablet [Pharmacy Med Name: BUPROPION HYDROCHLORIDE ER (XL) 150MG XL TABLET ER 24HR] 90 tablet 3     Sig: TAKE ONE (1) TABLET BY MOUTH DAILY.      Last office visit with prescribing clinician: 6/23/2023   Next office visit with prescribing clinician:   Return if symptoms worsen or fail to improve.      Jazmine Olmdeo MA  07/24/23, 09:05 EDT

## 2023-08-24 ENCOUNTER — OFFICE VISIT (OUTPATIENT)
Dept: OBSTETRICS AND GYNECOLOGY | Facility: CLINIC | Age: 38
End: 2023-08-24
Payer: COMMERCIAL

## 2023-08-24 VITALS
SYSTOLIC BLOOD PRESSURE: 120 MMHG | DIASTOLIC BLOOD PRESSURE: 80 MMHG | BODY MASS INDEX: 22.79 KG/M2 | WEIGHT: 145.2 LBS | HEIGHT: 67 IN

## 2023-08-24 DIAGNOSIS — Z30.41 ENCOUNTER FOR SURVEILLANCE OF CONTRACEPTIVE PILLS: ICD-10-CM

## 2023-08-24 DIAGNOSIS — Z01.419 WOMEN'S ANNUAL ROUTINE GYNECOLOGICAL EXAMINATION: Primary | ICD-10-CM

## 2023-08-24 PROCEDURE — 99395 PREV VISIT EST AGE 18-39: CPT | Performed by: OBSTETRICS & GYNECOLOGY

## 2023-08-24 RX ORDER — DROSPIRENONE AND ESTETROL 3-14.2(28)
1 KIT ORAL DAILY
Qty: 84 TABLET | Refills: 3 | Status: SHIPPED | OUTPATIENT
Start: 2023-08-24

## 2023-08-24 NOTE — PROGRESS NOTES
Gynecologic Annual Exam Note              Subjective     HPI  Lazara Sanchez is a 38 y.o.  female who presents for annual well woman exam as a established patient. There were no changes to her medical or surgical history since her last visit. LMP 2023.  She denies dysmenorrhea.  Partner Status: Marital Status: .  She has never been sexually active. STD testing recommendations have been explained to the patient and she does not desire STD testing.    Her periods are about q 4 weeks but start in the middle of her pill pack.  They last 7-14 days and are light flow overall.  She wants to discuss an alternative method or pill.  She reports her hair is thinning, c/o mood swings, irritability, and insomnia she feels may be related to hormonal changes.      She also requests CCUA today since she has had urinary frequency.  (CCUA neg. )    Additional OB/GYN History   Current contraception: contraceptive methods: OCP (estrogen/progesterone)  Desires to: continue contraception  Thromboembolic Disease: none      Last Pap : . Results: negative. HPV: negative.   Last Completed Pap Smear            PAP SMEAR (Every 3 Years) Next due on 5/3/2025      2022  SCANNED - PAP SMEAR    2020  Done - Negative                     History of abnormal Pap smear: no  Family history of uterine, colon, breast, or ovarian cancer: yes - MGM had breast cancer and maternal aunt  Performs monthly Self-Breast Exam: no  Exercises Regularly:yes  Feelings of Anxiety or Depression: yes - depression that is managed on current medication  Tobacco Usage?: No       Current Outpatient Medications:     acetaminophen (TYLENOL) 500 MG tablet, Take 2 tablets by mouth Every 6 (Six) Hours As Needed for Mild Pain., Disp: , Rfl:     buPROPion XL (WELLBUTRIN XL) 150 MG 24 hr tablet, TAKE ONE (1) TABLET BY MOUTH DAILY., Disp: 90 tablet, Rfl: 1    fexofenadine (ALLEGRA) 180 MG tablet, Take 1 tablet by mouth Daily., Disp: , Rfl:  "    Melatonin 1 MG/4ML liquid, Take 1 mL by mouth As Needed., Disp: , Rfl:     Drospirenone-Estetrol (Nextstellis) 3-14.2 MG tablet, Take 1 tablet by mouth Daily., Disp: 84 tablet, Rfl: 3         OB History          2    Para   2    Term   2       0    AB   0    Living   2         SAB   0    IAB   0    Ectopic   0    Molar   0    Multiple   0    Live Births   2                Health Maintenance   Topic Date Due    TDAP/TD VACCINES (1 - Tdap) Never done    HEPATITIS C SCREENING  Never done    Annual Gynecologic Pelvic and Breast Exam  2023    INFLUENZA VACCINE  10/01/2023    ANNUAL PHYSICAL  10/28/2023    PAP SMEAR  2025    COVID-19 Vaccine  Completed    Pneumococcal Vaccine 0-64  Aged Out       Past Medical History:   Diagnosis Date    Acid reflux     Allergic     Seasonal    Anemia     iron    Anxiety     Depression     since     Eczema     Exercise induced bronchospasm     Heartburn     Takes pepcid everyday for 3 years    History of kidney stones 2017    History of MRSA infection     HPV (human papilloma virus) infection     genital warts removed     Kidney stone     Migraine     Postpartum depression     with G1    Scoliosis     As a kid    Seasonal allergies     Varicella         Past Surgical History:   Procedure Laterality Date    APPENDECTOMY  2018    SESAMOIDECTOMY FIRST TOE Left 2010    WISDOM TOOTH EXTRACTION         The additional following portions of the patient's history were reviewed and updated as appropriate: allergies, current medications, past family history, past medical history, past social history, past surgical history, and problem list.    Review of Systems      I have reviewed and agree with the HPI, ROS, and historical information as entered above. Lisa Belle MD          Objective   /80   Ht 170.2 cm (67\")   Wt 65.9 kg (145 lb 3.2 oz)   LMP 2023 (Approximate)   BMI 22.74 kg/mý     Physical Exam  Vitals and nursing " note reviewed. Exam conducted with a chaperone present.   Constitutional:       Appearance: She is well-developed.   HENT:      Head: Normocephalic and atraumatic.   Eyes:      Pupils: Pupils are equal, round, and reactive to light.   Neck:      Thyroid: No thyroid mass or thyromegaly.   Pulmonary:      Effort: Pulmonary effort is normal. No retractions.   Chest:      Chest wall: No mass.   Breasts:     Right: Normal. No mass, nipple discharge, skin change or tenderness.      Left: Normal. No mass, nipple discharge, skin change or tenderness.   Abdominal:      General: Bowel sounds are normal.      Palpations: Abdomen is soft. Abdomen is not rigid. There is no mass.      Tenderness: There is no abdominal tenderness. There is no guarding.      Hernia: No hernia is present. There is no hernia in the left inguinal area or right inguinal area.   Genitourinary:     Exam position: Lithotomy position.      Pubic Area: No rash.       Labia:         Right: No rash, tenderness or lesion.         Left: No rash, tenderness or lesion.       Urethra: No urethral pain or urethral swelling.      Vagina: Normal. No vaginal discharge or lesions.      Cervix: No cervical motion tenderness, discharge, lesion or cervical bleeding.      Uterus: Normal. Not enlarged, not fixed and not tender.       Adnexa:         Right: No mass, tenderness or fullness.          Left: No mass, tenderness or fullness.        Rectum: No external hemorrhoid.   Musculoskeletal:      Cervical back: Normal range of motion. No muscular tenderness.      Right lower leg: No edema.      Left lower leg: No edema.   Skin:     General: Skin is warm and dry.   Neurological:      Mental Status: She is alert and oriented to person, place, and time.      Motor: Motor function is intact.   Psychiatric:         Mood and Affect: Mood and affect normal.         Behavior: Behavior normal.          Assessment and Plan    Problem List Items Addressed This Visit    None  Visit  Diagnoses       Women's annual routine gynecological examination    -  Primary    Encounter for surveillance of contraceptive pills                GYN annual well woman exam.   Reviewed pap guidelines.   OCP's/Vaginal Ring - Discussed side effects of nausea, BTB, headaches, breast tenderness and slight weight gain in the first three cycles.  Understands risks of blood clots, stroke, and theoretical risk of breast cancer.  Denies family history of blood clots.  Reviewed risks/benefits of hormonal contraception after age 35, including possible increased risk of breast cancer, heart disease, blood clots and strokes.  Patient strongly desires to stay on hormonal contraception.  Reviewed monthly self breast exams.  Instructed to call with lumps, pain, or breast discharge.    Reviewed exercise as a preventative health measures.   RTC in 1 year or PRN with problems  Return in about 1 year (around 8/24/2024) for Annual physical.      Lisa Belle MD  08/24/2023

## 2024-01-25 DIAGNOSIS — F33.42 MAJOR DEPRESSIVE DISORDER, RECURRENT, IN FULL REMISSION: ICD-10-CM

## 2024-01-25 RX ORDER — BUPROPION HYDROCHLORIDE 150 MG/1
TABLET ORAL
Qty: 90 TABLET | Refills: 1 | Status: SHIPPED | OUTPATIENT
Start: 2024-01-25

## 2024-08-30 ENCOUNTER — OFFICE VISIT (OUTPATIENT)
Dept: OBSTETRICS AND GYNECOLOGY | Facility: CLINIC | Age: 39
End: 2024-08-30
Payer: COMMERCIAL

## 2024-08-30 VITALS
HEIGHT: 67 IN | BODY MASS INDEX: 22.35 KG/M2 | SYSTOLIC BLOOD PRESSURE: 106 MMHG | DIASTOLIC BLOOD PRESSURE: 70 MMHG | WEIGHT: 142.4 LBS

## 2024-08-30 DIAGNOSIS — Z01.419 WOMEN'S ANNUAL ROUTINE GYNECOLOGICAL EXAMINATION: Primary | ICD-10-CM

## 2024-08-30 PROCEDURE — 99395 PREV VISIT EST AGE 18-39: CPT | Performed by: OBSTETRICS & GYNECOLOGY

## 2024-08-30 RX ORDER — CHOLECALCIFEROL (VITAMIN D3) 25 MCG
1000 TABLET ORAL DAILY
COMMUNITY

## 2024-08-30 NOTE — PROGRESS NOTES
Gynecologic Annual Exam Note        Gynecologic Exam (Annual )        Subjective     HPI  Lazara Sanchez is a 39 y.o.  female who presents for annual well woman exam as a established patient. There were no changes to her medical or surgical history since her last visit.. Patient's last menstrual period was 08/15/2024. Her periods occur every 25-30 days, lasting 7-9 days.  The flow is moderate. She reports dysmenorrhea is mild occurring first 1-2 days of flow. Marital Status: .  She is sexually active. She has not had new partners.. STD testing recommendations have been explained to the patient and she does not desire STD testing.    The patient would like to discuss the following complaints today: possible hemorrhoid that she noticed within the past week.   Her kids are 3 and 6 now.     Additional OB/GYN History   contraceptive methods: Vasectomy   Desires to: do not start contraception  Thromboembolic Disease: none  History of migraines: yes with aura  Age of menarche: 13    History of STD: no    Last Pap : 5/3/2022. Results: negative. HPV: negative.   Last Completed Pap Smear            PAP SMEAR (Every 3 Years) Next due on 5/3/2025      2022  SCANNED - PAP SMEAR    2020  Done - Negative                     History of abnormal Pap smear: no  Gardasil status:completed  Family history of uterine, colon, breast, or ovarian cancer: yes -  MGM had breast cancer and maternal aunt-breast and ovarian   Performs monthly Self-Breast Exam: no  Exercises Regularly:yes  Feelings of Anxiety or Depression: yes - treated with medication   Tobacco Usage?: No       Current Outpatient Medications:     acetaminophen (TYLENOL) 500 MG tablet, Take 2 tablets by mouth Every 6 (Six) Hours As Needed for Mild Pain., Disp: , Rfl:     buPROPion XL (WELLBUTRIN XL) 150 MG 24 hr tablet, TAKE ONE (1) TABLET BY MOUTH DAILY., Disp: 90 tablet, Rfl: 1    Cholecalciferol 25 MCG (1000 UT) tablet, Take 1 tablet by  mouth Daily., Disp: , Rfl:     fexofenadine (ALLEGRA) 180 MG tablet, Take 1 tablet by mouth Daily., Disp: , Rfl:     Melatonin 1 MG/4ML liquid, Take 1 mL by mouth As Needed., Disp: , Rfl:      Patient denies the need for medication refills today.    OB History          2    Para   2    Term   2       0    AB   0    Living   2         SAB   0    IAB   0    Ectopic   0    Molar   0    Multiple   0    Live Births   2                Health Maintenance   Topic Date Due    TDAP/TD VACCINES (1 - Tdap) Never done    HEPATITIS C SCREENING  Never done    COVID-19 Vaccine (2023- season) 2023    ANNUAL PHYSICAL  10/28/2023    Annual Gynecologic Pelvic and Breast Exam  2024    INFLUENZA VACCINE  2024    PAP SMEAR  2025    Pneumococcal Vaccine 0-64  Aged Out       Past Medical History:   Diagnosis Date    Acid reflux     Allergic     Seasonal    Anemia     iron    Anxiety     Depression     since     Eczema     Exercise induced bronchospasm     Heartburn 2008    Takes pepcid everyday for 3 years    History of kidney stones     History of MRSA infection     HPV (human papilloma virus) infection     genital warts removed     Kidney stone     Migraine     Postpartum depression     with G1    Scoliosis     As a kid    Seasonal allergies     Varicella         Past Surgical History:   Procedure Laterality Date    APPENDECTOMY  2018    SESAMOIDECTOMY FIRST TOE Left 2010    WISDOM TOOTH EXTRACTION         The additional following portions of the patient's history were reviewed and updated as appropriate: allergies, current medications, past family history, past medical history, past social history, past surgical history, and problem list.    Review of Systems   Constitutional: Negative.    HENT: Negative.     Eyes: Negative.    Respiratory: Negative.     Cardiovascular: Negative.    Gastrointestinal: Negative.    Endocrine: Negative.    Genitourinary: Negative.   "  Musculoskeletal: Negative.    Skin: Negative.    Allergic/Immunologic: Negative.    Neurological: Negative.    Hematological: Negative.    Psychiatric/Behavioral: Negative.           I have reviewed and agree with the HPI, ROS, and historical information as entered above. Lisa Belle MD          Objective   /70   Ht 170.2 cm (67\")   Wt 64.6 kg (142 lb 6.4 oz)   LMP 08/15/2024   BMI 22.30 kg/m²     Physical Exam  Vitals and nursing note reviewed. Exam conducted with a chaperone present.   Constitutional:       Appearance: She is well-developed.   HENT:      Head: Normocephalic and atraumatic.   Eyes:      Pupils: Pupils are equal, round, and reactive to light.   Neck:      Thyroid: No thyroid mass or thyromegaly.   Pulmonary:      Effort: Pulmonary effort is normal. No retractions.   Chest:      Chest wall: No mass.   Breasts:     Right: Normal. No mass, nipple discharge, skin change or tenderness.      Left: Normal. No mass, nipple discharge, skin change or tenderness.   Abdominal:      General: Bowel sounds are normal.      Palpations: Abdomen is soft. Abdomen is not rigid. There is no mass.      Tenderness: There is no abdominal tenderness. There is no guarding.      Hernia: No hernia is present. There is no hernia in the left inguinal area or right inguinal area.   Genitourinary:     Exam position: Lithotomy position.      Pubic Area: No rash.       Labia:         Right: No rash, tenderness or lesion.         Left: No rash, tenderness or lesion.       Urethra: No urethral pain or urethral swelling.      Vagina: Normal. No vaginal discharge or lesions.      Cervix: No cervical motion tenderness, discharge, lesion or cervical bleeding.      Uterus: Normal. Not enlarged, not fixed and not tender.       Adnexa:         Right: No mass, tenderness or fullness.          Left: No mass, tenderness or fullness.        Rectum: No external hemorrhoid.   Musculoskeletal:      Cervical back: Normal range of " motion. No muscular tenderness.      Right lower leg: No edema.      Left lower leg: No edema.   Skin:     General: Skin is warm and dry.   Neurological:      Mental Status: She is alert and oriented to person, place, and time.      Motor: Motor function is intact.   Psychiatric:         Mood and Affect: Mood and affect normal.         Behavior: Behavior normal.            Assessment and Plan    Problem List Items Addressed This Visit    None  Visit Diagnoses       Women's annual routine gynecological examination    -  Primary            GYN annual well woman exam.   Reviewed pap guidelines.   Reviewed monthly self breast exams.  Instructed to call with lumps, pain, or breast discharge.    Reviewed exercise as a preventative health measures.   Reccommended Flu Vaccine in Fall of each year.  RTC in 1 year or PRN with problems  Return in about 1 year (around 8/30/2025) for Annual physical.    Lisa Belle MD  08/30/2024

## 2024-09-19 ENCOUNTER — APPOINTMENT (OUTPATIENT)
Dept: CT IMAGING | Facility: HOSPITAL | Age: 39
End: 2024-09-19
Payer: COMMERCIAL

## 2024-09-19 ENCOUNTER — HOSPITAL ENCOUNTER (EMERGENCY)
Facility: HOSPITAL | Age: 39
Discharge: HOME | End: 2024-09-19
Attending: EMERGENCY MEDICINE
Payer: COMMERCIAL

## 2024-09-19 VITALS
HEIGHT: 66 IN | WEIGHT: 140 LBS | DIASTOLIC BLOOD PRESSURE: 70 MMHG | BODY MASS INDEX: 22.5 KG/M2 | OXYGEN SATURATION: 98 % | TEMPERATURE: 98.4 F | SYSTOLIC BLOOD PRESSURE: 108 MMHG | HEART RATE: 70 BPM | RESPIRATION RATE: 20 BRPM

## 2024-09-19 DIAGNOSIS — R10.9 ACUTE ABDOMINAL PAIN: Primary | ICD-10-CM

## 2024-09-19 LAB
ALBUMIN SERPL-MCNC: 5.1 G/DL (ref 3.5–5.2)
ALBUMIN/GLOB SERPL: 1.6 G/DL
ALP SERPL-CCNC: 42 U/L (ref 39–117)
ALT SERPL W P-5'-P-CCNC: 13 U/L (ref 1–33)
ANION GAP SERPL CALCULATED.3IONS-SCNC: 13 MMOL/L (ref 5–15)
AST SERPL-CCNC: 17 U/L (ref 1–32)
BACTERIA UR QL AUTO: ABNORMAL /HPF
BASOPHILS # BLD AUTO: 0.05 10*3/MM3 (ref 0–0.2)
BASOPHILS NFR BLD AUTO: 0.5 % (ref 0–1.5)
BILIRUB SERPL-MCNC: 0.6 MG/DL (ref 0–1.2)
BILIRUB UR QL STRIP: NEGATIVE
BUN SERPL-MCNC: 9 MG/DL (ref 6–20)
BUN/CREAT SERPL: 12 (ref 7–25)
CALCIUM SPEC-SCNC: 9.6 MG/DL (ref 8.6–10.5)
CHLORIDE SERPL-SCNC: 99 MMOL/L (ref 98–107)
CLARITY UR: CLEAR
CO2 SERPL-SCNC: 26 MMOL/L (ref 22–29)
COLOR UR: YELLOW
CREAT SERPL-MCNC: 0.75 MG/DL (ref 0.57–1)
D-LACTATE SERPL-SCNC: 0.8 MMOL/L (ref 0.5–2)
DEPRECATED RDW RBC AUTO: 43.6 FL (ref 37–54)
EGFRCR SERPLBLD CKD-EPI 2021: 104 ML/MIN/1.73
EOSINOPHIL # BLD AUTO: 0.09 10*3/MM3 (ref 0–0.4)
EOSINOPHIL NFR BLD AUTO: 1 % (ref 0.3–6.2)
ERYTHROCYTE [DISTWIDTH] IN BLOOD BY AUTOMATED COUNT: 12.7 % (ref 12.3–15.4)
GLOBULIN UR ELPH-MCNC: 3.1 GM/DL
GLUCOSE SERPL-MCNC: 96 MG/DL (ref 65–99)
GLUCOSE UR STRIP-MCNC: NEGATIVE MG/DL
HCG INTACT+B SERPL-ACNC: <0.1 MIU/ML
HCT VFR BLD AUTO: 45.5 % (ref 34–46.6)
HGB BLD-MCNC: 14.9 G/DL (ref 12–15.9)
HGB UR QL STRIP.AUTO: NEGATIVE
HOLD SPECIMEN: NORMAL
HYALINE CASTS UR QL AUTO: ABNORMAL /LPF
IMM GRANULOCYTES # BLD AUTO: 0.04 10*3/MM3 (ref 0–0.05)
IMM GRANULOCYTES NFR BLD AUTO: 0.4 % (ref 0–0.5)
KETONES UR QL STRIP: NEGATIVE
LEUKOCYTE ESTERASE UR QL STRIP.AUTO: ABNORMAL
LIPASE SERPL-CCNC: 32 U/L (ref 13–60)
LYMPHOCYTES # BLD AUTO: 1.5 10*3/MM3 (ref 0.7–3.1)
LYMPHOCYTES NFR BLD AUTO: 15.9 % (ref 19.6–45.3)
MCH RBC QN AUTO: 30.2 PG (ref 26.6–33)
MCHC RBC AUTO-ENTMCNC: 32.7 G/DL (ref 31.5–35.7)
MCV RBC AUTO: 92.3 FL (ref 79–97)
MONOCYTES # BLD AUTO: 0.4 10*3/MM3 (ref 0.1–0.9)
MONOCYTES NFR BLD AUTO: 4.2 % (ref 5–12)
NEUTROPHILS NFR BLD AUTO: 7.35 10*3/MM3 (ref 1.7–7)
NEUTROPHILS NFR BLD AUTO: 78 % (ref 42.7–76)
NITRITE UR QL STRIP: NEGATIVE
NRBC BLD AUTO-RTO: 0 /100 WBC (ref 0–0.2)
PH UR STRIP.AUTO: 6 [PH] (ref 5–8)
PLATELET # BLD AUTO: 321 10*3/MM3 (ref 140–450)
PMV BLD AUTO: 10.2 FL (ref 6–12)
POTASSIUM SERPL-SCNC: 3.8 MMOL/L (ref 3.5–5.2)
PROT SERPL-MCNC: 8.2 G/DL (ref 6–8.5)
PROT UR QL STRIP: NEGATIVE
RBC # BLD AUTO: 4.93 10*6/MM3 (ref 3.77–5.28)
RBC # UR STRIP: ABNORMAL /HPF
REF LAB TEST METHOD: ABNORMAL
SODIUM SERPL-SCNC: 138 MMOL/L (ref 136–145)
SP GR UR STRIP: >=1.03 (ref 1–1.03)
SQUAMOUS #/AREA URNS HPF: ABNORMAL /HPF
UROBILINOGEN UR QL STRIP: ABNORMAL
WBC # UR STRIP: ABNORMAL /HPF
WBC NRBC COR # BLD AUTO: 9.43 10*3/MM3 (ref 3.4–10.8)
WHOLE BLOOD HOLD COAG: NORMAL
WHOLE BLOOD HOLD SPECIMEN: NORMAL

## 2024-09-19 PROCEDURE — 80053 COMPREHEN METABOLIC PANEL: CPT | Performed by: EMERGENCY MEDICINE

## 2024-09-19 PROCEDURE — 36415 COLL VENOUS BLD VENIPUNCTURE: CPT

## 2024-09-19 PROCEDURE — 81001 URINALYSIS AUTO W/SCOPE: CPT | Performed by: EMERGENCY MEDICINE

## 2024-09-19 PROCEDURE — 96375 TX/PRO/DX INJ NEW DRUG ADDON: CPT

## 2024-09-19 PROCEDURE — 25010000002 HYDROMORPHONE PER 4 MG: Performed by: EMERGENCY MEDICINE

## 2024-09-19 PROCEDURE — 83690 ASSAY OF LIPASE: CPT | Performed by: EMERGENCY MEDICINE

## 2024-09-19 PROCEDURE — 25510000001 IOPAMIDOL 61 % SOLUTION: Performed by: EMERGENCY MEDICINE

## 2024-09-19 PROCEDURE — 99285 EMERGENCY DEPT VISIT HI MDM: CPT

## 2024-09-19 PROCEDURE — 83605 ASSAY OF LACTIC ACID: CPT | Performed by: EMERGENCY MEDICINE

## 2024-09-19 PROCEDURE — 96374 THER/PROPH/DIAG INJ IV PUSH: CPT

## 2024-09-19 PROCEDURE — 25810000003 SODIUM CHLORIDE 0.9 % SOLUTION: Performed by: EMERGENCY MEDICINE

## 2024-09-19 PROCEDURE — 74177 CT ABD & PELVIS W/CONTRAST: CPT

## 2024-09-19 PROCEDURE — 25010000002 ONDANSETRON PER 1 MG: Performed by: EMERGENCY MEDICINE

## 2024-09-19 PROCEDURE — 85025 COMPLETE CBC W/AUTO DIFF WBC: CPT | Performed by: EMERGENCY MEDICINE

## 2024-09-19 PROCEDURE — 84702 CHORIONIC GONADOTROPIN TEST: CPT | Performed by: EMERGENCY MEDICINE

## 2024-09-19 RX ORDER — HYDROMORPHONE HYDROCHLORIDE 1 MG/ML
0.5 INJECTION, SOLUTION INTRAMUSCULAR; INTRAVENOUS; SUBCUTANEOUS ONCE
Status: COMPLETED | OUTPATIENT
Start: 2024-09-19 | End: 2024-09-19

## 2024-09-19 RX ORDER — TRAMADOL HYDROCHLORIDE 50 MG/1
50 TABLET ORAL EVERY 6 HOURS PRN
Qty: 8 TABLET | Refills: 0 | Status: SHIPPED | OUTPATIENT
Start: 2024-09-19

## 2024-09-19 RX ORDER — SODIUM CHLORIDE 9 MG/ML
10 INJECTION, SOLUTION INTRAMUSCULAR; INTRAVENOUS; SUBCUTANEOUS AS NEEDED
Status: DISCONTINUED | OUTPATIENT
Start: 2024-09-19 | End: 2024-09-19 | Stop reason: HOSPADM

## 2024-09-19 RX ORDER — SODIUM CHLORIDE 0.9 % (FLUSH) 0.9 %
10 SYRINGE (ML) INJECTION AS NEEDED
Status: DISCONTINUED | OUTPATIENT
Start: 2024-09-19 | End: 2024-09-19 | Stop reason: HOSPADM

## 2024-09-19 RX ORDER — IOPAMIDOL 612 MG/ML
89 INJECTION, SOLUTION INTRAVASCULAR
Status: COMPLETED | OUTPATIENT
Start: 2024-09-19 | End: 2024-09-19

## 2024-09-19 RX ORDER — ONDANSETRON 2 MG/ML
4 INJECTION INTRAMUSCULAR; INTRAVENOUS ONCE
Status: COMPLETED | OUTPATIENT
Start: 2024-09-19 | End: 2024-09-19

## 2024-09-19 RX ADMIN — HYDROMORPHONE HYDROCHLORIDE 0.5 MG: 1 INJECTION, SOLUTION INTRAMUSCULAR; INTRAVENOUS; SUBCUTANEOUS at 07:44

## 2024-09-19 RX ADMIN — SODIUM CHLORIDE 1000 ML: 9 INJECTION, SOLUTION INTRAVENOUS at 07:45

## 2024-09-19 RX ADMIN — ONDANSETRON 4 MG: 2 INJECTION INTRAMUSCULAR; INTRAVENOUS at 07:44

## 2024-09-19 RX ADMIN — IOPAMIDOL 89 ML: 612 INJECTION, SOLUTION INTRAVENOUS at 09:01

## 2024-11-10 DIAGNOSIS — F33.42 MAJOR DEPRESSIVE DISORDER, RECURRENT, IN FULL REMISSION: ICD-10-CM

## 2024-11-11 NOTE — TELEPHONE ENCOUNTER
Rx Refill Note  Requested Prescriptions     Pending Prescriptions Disp Refills    buPROPion XL (WELLBUTRIN XL) 150 MG 24 hr tablet 90 tablet 1     Sig: Take 1 tablet by mouth Daily.      Last office visit with prescribing clinician: 6/23/2023   Last telemedicine visit with prescribing clinician: Visit date not found   Next office visit with prescribing clinician: Visit date not found   {  Osiris Frederick MA  11/11/24, 09:53 EST    Relay     PT due for an appointment

## 2024-11-12 RX ORDER — BUPROPION HYDROCHLORIDE 150 MG/1
150 TABLET ORAL DAILY
Qty: 90 TABLET | Refills: 1 | Status: SHIPPED | OUTPATIENT
Start: 2024-11-12 | End: 2024-11-14 | Stop reason: SDUPTHER

## 2024-11-14 ENCOUNTER — OFFICE VISIT (OUTPATIENT)
Age: 39
End: 2024-11-14
Payer: COMMERCIAL

## 2024-11-14 VITALS
BODY MASS INDEX: 23.06 KG/M2 | RESPIRATION RATE: 18 BRPM | SYSTOLIC BLOOD PRESSURE: 128 MMHG | WEIGHT: 143.5 LBS | HEIGHT: 66 IN | DIASTOLIC BLOOD PRESSURE: 74 MMHG | HEART RATE: 76 BPM | OXYGEN SATURATION: 98 %

## 2024-11-14 DIAGNOSIS — F33.41 RECURRENT MAJOR DEPRESSIVE DISORDER, IN PARTIAL REMISSION: Primary | ICD-10-CM

## 2024-11-14 DIAGNOSIS — R41.840 ATTENTION DEFICIT: ICD-10-CM

## 2024-11-14 DIAGNOSIS — Z23 NEED FOR VACCINATION: ICD-10-CM

## 2024-11-14 RX ORDER — BUPROPION HYDROCHLORIDE 150 MG/1
150 TABLET ORAL DAILY
Qty: 90 TABLET | Refills: 3 | Status: SHIPPED | OUTPATIENT
Start: 2024-11-14

## 2024-11-14 NOTE — PROGRESS NOTES
"Chief Complaint  Depression    Subjective        Lazara Sanchez presents to [facility]  Depression  Her past medical history is significant for depression.   Answers submitted by the patient for this visit:  Other (Submitted on 11/14/2024)  Please describe your symptoms.: Low mood  Have you had these symptoms before?: Yes  How long have you been having these symptoms?: Greater than 2 weeks  Please list any medications you are currently taking for this condition.: Wellbutrin  Please describe any probable cause for these symptoms. : Depression  Primary Reason for Visit (Submitted on 11/14/2024)  What is the primary reason for your visit?: Problem Not Listed      History of Present Illness  The patient is a 39-year-old female presenting today for depression.    She has been experiencing a low mood for the past week, which she attributes to running out of her Wellbutrin medication. She has been taking bupropion consistently, but ran out of it three days ago. To bridge the gap, she managed to borrow some from her , who had switched medications. Her depression symptoms were well-managed on a 150 mg dose of bupropion until recently.    She is currently under significant stress due to a busy period at work.    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    She plans to have an evaluation for ADHD in the near future.    Objective   Vital Signs:  /74   Pulse 76   Resp 18   Ht 167.6 cm (66\")   Wt 65.1 kg (143 lb 8 oz)   SpO2 98%   BMI 23.16 kg/m²   Estimated body mass index is 23.16 kg/m² as calculated from the following:    Height as of this encounter: 167.6 cm (66\").    Weight as of this encounter: 65.1 kg (143 lb 8 oz).    BMI is within normal parameters. No other follow-up for BMI required.      The following portions of the " patient's history were reviewed and updated as appropriate: allergies, current medications, past family history, past medical history, past social history, past surgical history, and problem list.       Physical Exam  Vitals reviewed.   Constitutional:       General: She is not in acute distress.     Appearance: She is not ill-appearing.   Cardiovascular:      Rate and Rhythm: Normal rate and regular rhythm.   Pulmonary:      Effort: Pulmonary effort is normal.      Breath sounds: Normal breath sounds.   Neurological:      Mental Status: She is alert.   Psychiatric:         Attention and Perception: Attention normal.         Mood and Affect: Affect is tearful.         Behavior: Behavior normal. Behavior is not hyperactive. Behavior is cooperative.         Thought Content: Thought content normal.        Result Review :                Assessment and Plan   Diagnoses and all orders for this visit:    1. Recurrent major depressive disorder, in partial remission (Primary)  -     buPROPion XL (WELLBUTRIN XL) 150 MG 24 hr tablet; Take 1 tablet by mouth Daily.  Dispense: 90 tablet; Refill: 3    2. Need for vaccination  -     Fluzone >6mos    3. Attention deficit             Assessment & Plan  1. Depression.  The patient has been experiencing increased work stress and seasonal changes affecting her mood. She ran out of her bupropion 150 mg three days ago and borrowed some from her , which helped bridge the gap. She reports that her depression was well managed on bupropion 150 mg until recently. A prescription for bupropion  mg has been provided for a year's supply. The patient is advised to continue taking bupropion year-round as it has been effective for her.    2. ADHD.  The patient is considering an evaluation for ADHD soon. She has been informed that if diagnosed, she will need to see a psychiatrist for medication management, as her current provider does not prescribe ADHD medications. She is advised to find  a psychiatrist in the [facility] for further treatment.    3. Health Maintenance.  An influenza vaccine was administered during this visit.    Follow-up  Patient is scheduled for a follow-up visit in 1 year.      Follow Up   Return in about 1 year (around 11/3/2025) for Office visit depression.  Patient was given instructions and counseling regarding her condition or for health maintenance advice. Please see specific information pulled into the AVS if appropriate.         Patient or patient representative verbalized consent for the use of Ambient Listening during the visit with  Caroline Alicea MD for chart documentation. 11/14/2024  09:44 EST    Electronically signed by Caroline Alicea MD, 11/14/24, 9:44 AM EST.